# Patient Record
Sex: FEMALE | Race: WHITE | Employment: OTHER | URBAN - METROPOLITAN AREA
[De-identification: names, ages, dates, MRNs, and addresses within clinical notes are randomized per-mention and may not be internally consistent; named-entity substitution may affect disease eponyms.]

---

## 2021-10-10 ENCOUNTER — HOSPITAL ENCOUNTER (EMERGENCY)
Age: 76
Discharge: NURSING, REHAB OR LONG-TERM CARE | End: 2021-10-11
Attending: EMERGENCY MEDICINE
Payer: COMMERCIAL

## 2021-10-10 DIAGNOSIS — S81.802A WOUND OF LEFT LOWER EXTREMITY, INITIAL ENCOUNTER: Primary | ICD-10-CM

## 2021-10-10 DIAGNOSIS — D64.9 ANEMIA, UNSPECIFIED TYPE: ICD-10-CM

## 2021-10-10 PROCEDURE — 99284 EMERGENCY DEPT VISIT MOD MDM: CPT

## 2021-10-10 ASSESSMENT — PAIN DESCRIPTION - LOCATION: LOCATION: LEG

## 2021-10-10 ASSESSMENT — PAIN DESCRIPTION - PAIN TYPE: TYPE: ACUTE PAIN

## 2021-10-10 ASSESSMENT — PAIN SCALES - GENERAL: PAINLEVEL_OUTOF10: 5

## 2021-10-10 ASSESSMENT — PAIN DESCRIPTION - ORIENTATION: ORIENTATION: LEFT

## 2021-10-11 VITALS
DIASTOLIC BLOOD PRESSURE: 60 MMHG | OXYGEN SATURATION: 94 % | HEART RATE: 62 BPM | RESPIRATION RATE: 16 BRPM | TEMPERATURE: 98.2 F | SYSTOLIC BLOOD PRESSURE: 120 MMHG

## 2021-10-11 LAB
ALBUMIN SERPL-MCNC: 2.8 G/DL (ref 3.5–5.2)
ALP BLD-CCNC: 95 U/L (ref 35–104)
ALT SERPL-CCNC: 9 U/L (ref 0–32)
ANION GAP SERPL CALCULATED.3IONS-SCNC: 7 MMOL/L (ref 7–16)
AST SERPL-CCNC: 18 U/L (ref 0–31)
BASOPHILS ABSOLUTE: 0.06 E9/L (ref 0–0.2)
BASOPHILS RELATIVE PERCENT: 0.8 % (ref 0–2)
BILIRUB SERPL-MCNC: 0.5 MG/DL (ref 0–1.2)
BUN BLDV-MCNC: 18 MG/DL (ref 6–23)
CALCIUM SERPL-MCNC: 8.5 MG/DL (ref 8.6–10.2)
CHLORIDE BLD-SCNC: 103 MMOL/L (ref 98–107)
CO2: 27 MMOL/L (ref 22–29)
CREAT SERPL-MCNC: 0.8 MG/DL (ref 0.5–1)
EOSINOPHILS ABSOLUTE: 0.25 E9/L (ref 0.05–0.5)
EOSINOPHILS RELATIVE PERCENT: 3.3 % (ref 0–6)
GFR AFRICAN AMERICAN: >60
GFR NON-AFRICAN AMERICAN: >60 ML/MIN/1.73
GLUCOSE BLD-MCNC: 167 MG/DL (ref 74–99)
HCT VFR BLD CALC: 30.8 % (ref 34–48)
HEMOGLOBIN: 10.1 G/DL (ref 11.5–15.5)
IMMATURE GRANULOCYTES #: 0.02 E9/L
IMMATURE GRANULOCYTES %: 0.3 % (ref 0–5)
LYMPHOCYTES ABSOLUTE: 2.96 E9/L (ref 1.5–4)
LYMPHOCYTES RELATIVE PERCENT: 38.7 % (ref 20–42)
MCH RBC QN AUTO: 31.9 PG (ref 26–35)
MCHC RBC AUTO-ENTMCNC: 32.8 % (ref 32–34.5)
MCV RBC AUTO: 97.2 FL (ref 80–99.9)
MONOCYTES ABSOLUTE: 0.59 E9/L (ref 0.1–0.95)
MONOCYTES RELATIVE PERCENT: 7.7 % (ref 2–12)
NEUTROPHILS ABSOLUTE: 3.76 E9/L (ref 1.8–7.3)
NEUTROPHILS RELATIVE PERCENT: 49.2 % (ref 43–80)
PDW BLD-RTO: 13.4 FL (ref 11.5–15)
PLATELET # BLD: 201 E9/L (ref 130–450)
PMV BLD AUTO: 11.3 FL (ref 7–12)
POTASSIUM REFLEX MAGNESIUM: 4.2 MMOL/L (ref 3.5–5)
RBC # BLD: 3.17 E12/L (ref 3.5–5.5)
SODIUM BLD-SCNC: 137 MMOL/L (ref 132–146)
TOTAL PROTEIN: 5.6 G/DL (ref 6.4–8.3)
WBC # BLD: 7.6 E9/L (ref 4.5–11.5)

## 2021-10-11 PROCEDURE — 6370000000 HC RX 637 (ALT 250 FOR IP): Performed by: EMERGENCY MEDICINE

## 2021-10-11 PROCEDURE — 80053 COMPREHEN METABOLIC PANEL: CPT

## 2021-10-11 PROCEDURE — 85025 COMPLETE CBC W/AUTO DIFF WBC: CPT

## 2021-10-11 RX ORDER — CEPHALEXIN 500 MG/1
500 CAPSULE ORAL EVERY 6 HOURS
Qty: 28 CAPSULE | Refills: 0 | Status: SHIPPED | OUTPATIENT
Start: 2021-10-11 | End: 2021-10-18

## 2021-10-11 RX ORDER — CEPHALEXIN 500 MG/1
500 CAPSULE ORAL ONCE
Status: COMPLETED | OUTPATIENT
Start: 2021-10-11 | End: 2021-10-11

## 2021-10-11 RX ORDER — DIAPER,BRIEF,INFANT-TODD,DISP
EACH MISCELLANEOUS
Status: DISCONTINUED
Start: 2021-10-11 | End: 2021-10-11 | Stop reason: HOSPADM

## 2021-10-11 RX ADMIN — CEPHALEXIN 500 MG: 500 CAPSULE ORAL at 01:51

## 2021-10-11 NOTE — ED NOTES
Bed:  HALL-06  Expected date:   Expected time:   Means of arrival:   Comments:  EMS     Drea Iqbal RN  12/79/73 1233

## 2021-10-11 NOTE — ED PROVIDER NOTES
HPI:  10/11/21, Time: 12:08 AM EDT         Siena Kim is a 67 y.o. female presenting to the ED for evaluation of wound to her left shin. Patient has a history of dementia, so I was unable to obtain a complete history. History was obtained from EMS as well as nursing who spoke with the nursing home. Patient presents from [facility]. Patient has reportedly had a wound to her left shin for months. Her daughter wanted the patient's wound evaluated, so they sent the patient to the ED for further evaluation. There has been no history of fevers. Patient states that she is feeling nauseous but otherwise has no complaints. Review of Systems:   Unable to obtain complete ROS due to dementia.        --------------------------------------------- PAST HISTORY ---------------------------------------------  Past Medical History:  has a past medical history of Abnormal stress ECG, Aortic stenosis, CAD (coronary artery disease), Cerebrovascular disease, Diabetes mellitus ([facility] [address] Diverticulitis, dyslipidemia, Hypertension, Hypothyroidism, MI (myocardial infarction) ([facility] [address] Obesity, Osteoporosis, Thyroid disease, TIA (transient ischemic attack), and Type II or unspecified type diabetes mellitus without mention of complication, not stated as uncontrolled. Past Surgical History:  has a past surgical history that includes Hysterectomy; hernia repair; Cholecystectomy; Dilatation, esophagus; Cardiac catheterization (6/06/12); vascular surgery; Tonsillectomy; Appendectomy; Diagnostic Cardiac Cath Lab Procedure; Cardiac surgery (6/15/12); Coronary artery bypass graft (6/15/12); and Cardiac valve replacement (June 2012). Social History:  reports that she quit smoking about 51 years ago. She smoked 0.50 packs per day. She has never used smokeless tobacco. She reports that she does not drink alcohol and does not use drugs.     Family History: family history includes Heart Disease in her brother and father; High Blood Pressure in her brother and father. The patients home medications have been reviewed.     Allergies: Phenergan [promethazine hcl]    -------------------------------------------------- RESULTS -------------------------------------------------  All laboratory and radiology results have been personally reviewed by myself   LABS:  Results for orders placed or performed during the hospital encounter of 10/10/21   CBC Auto Differential   Result Value Ref Range    WBC 7.6 4.5 - 11.5 E9/L    RBC 3.17 (L) 3.50 - 5.50 E12/L    Hemoglobin 10.1 (L) 11.5 - 15.5 g/dL    Hematocrit 30.8 (L) 34.0 - 48.0 %    MCV 97.2 80.0 - 99.9 fL    MCH 31.9 26.0 - 35.0 pg    MCHC 32.8 32.0 - 34.5 %    RDW 13.4 11.5 - 15.0 fL    Platelets 897 934 - 297 E9/L    MPV 11.3 7.0 - 12.0 fL    Neutrophils % 49.2 43.0 - 80.0 %    Immature Granulocytes % 0.3 0.0 - 5.0 %    Lymphocytes % 38.7 20.0 - 42.0 %    Monocytes % 7.7 2.0 - 12.0 %    Eosinophils % 3.3 0.0 - 6.0 %    Basophils % 0.8 0.0 - 2.0 %    Neutrophils Absolute 3.76 1.80 - 7.30 E9/L    Immature Granulocytes # 0.02 E9/L    Lymphocytes Absolute 2.96 1.50 - 4.00 E9/L    Monocytes Absolute 0.59 0.10 - 0.95 E9/L    Eosinophils Absolute 0.25 0.05 - 0.50 E9/L    Basophils Absolute 0.06 0.00 - 0.20 E9/L   Comprehensive Metabolic Panel w/ Reflex to MG   Result Value Ref Range    Sodium 137 132 - 146 mmol/L    Potassium reflex Magnesium 4.2 3.5 - 5.0 mmol/L    Chloride 103 98 - 107 mmol/L    CO2 27 22 - 29 mmol/L    Anion Gap 7 7 - 16 mmol/L    Glucose 167 (H) 74 - 99 mg/dL    BUN 18 6 - 23 mg/dL    CREATININE 0.8 0.5 - 1.0 mg/dL    GFR Non-African American >60 >=60 mL/min/1.73    GFR African American >60     Calcium 8.5 (L) 8.6 - 10.2 mg/dL    Total Protein 5.6 (L) 6.4 - 8.3 g/dL    Albumin 2.8 (L) 3.5 - 5.2 g/dL    Total Bilirubin 0.5 0.0 - 1.2 mg/dL    Alkaline Phosphatase 95 35 - 104 U/L    ALT 9 0 - 32 U/L    AST 18 0 - 31 U/L       RADIOLOGY:  Interpreted by Radiologist.  No orders to display ------------------------- NURSING NOTES AND VITALS REVIEWED ---------------------------   The nursing notes within the ED encounter and vital signs as below have been reviewed. /60   Pulse 62   Temp 98.2 °F (36.8 °C) (Oral)   Resp 16   SpO2 94%   Oxygen Saturation Interpretation: Normal      ---------------------------------------------------PHYSICAL EXAM--------------------------------------      Constitutional/General: Alert and oriented x2, appears chronically ill, non toxic in NAD  Head: Normocephalic and atraumatic  Eyes: EOMI. No scleral icterus. Mouth: Oropharynx clear, handling secretions, no trismus  Neck: Supple, full ROM,   Pulmonary: Lungs clear to auscultation bilaterally, no wheezes, rales, or rhonchi. Not in respiratory distress  Cardiovascular:  Regular rate and rhythm, no murmurs, gallops, or rubs. 2+ distal pulses  Abdomen: Soft, non tender, non distended,   Extremities: Warm and well perfused. Left lower extremity-chronic appearing wound to shin with pink granulation tissue, no drainage, no crepitus, no significant surrounding erythema, no induration, soft and easily compressible compartments, pedal pulses intact. Skin: warm and dry without rash  Neurologic: Awake, alert to person and place (patient reportedly has history of dementia), no focal motor or sensory deficits   Psych: Normal Affect. Behavior normal.      ------------------------------ ED COURSE/MEDICAL DECISION MAKING----------------------  Medications   cephALEXin (KEFLEX) capsule 500 mg (500 mg Oral Given 10/11/21 0151)       Medical Decision Making/ED COURSE:   Patient is a 50-year-old female presenting for wound check to her left shin. In the ED, patient was hemodynamically stable and afebrile. On exam, was well-appearing. She had a chronic appearing wound with granulation tissue to her left shin with some very mild surrounding cellulitis. She was neurovascularly intact. No evidence of acute limb ischemia.   No evidence of compartment syndrome. I reviewed and interpreted labs. Patient had mild anemia but otherwise unremarkable labs. No leukocytosis. No fevers or tachycardia. No evidence of sepsis. No evidence of active bleeding. Discussed anemia with family, and advised further outpatient work-up. Patient will be treated with Keflex and given wound center referral.  Supportive care measures and ED return precautions discussed with patient and daughter at bedside. Patient remained hemodynamically stable throughout ED course. ED Course as of Oct 11 0713   Mon Oct 11, 2021   0110 Discussed findings and plan for wound care referral with daughter at bedside. Strict ED return precautions discussed. [JA]      ED Course User Index  [JA] Julia Navarrete MD         Counseling: The emergency provider has spoken with the patient and daughter and discussed todays results, in addition to providing specific details for the plan of care and counseling regarding the diagnosis and prognosis. Questions are answered at this time and they are agreeable with the plan.      --------------------------------- IMPRESSION AND DISPOSITION ---------------------------------    IMPRESSION  1. Wound of left lower extremity, initial encounter    2. Anemia, unspecified type        DISPOSITION  Disposition: Discharge to nursing home  Patient condition is stable      NOTE: This report was transcribed using voice recognition software.  Every effort was made to ensure accuracy; however, inadvertent computerized transcription errors may be present    IJulia MD, am the primary provider of this record       Julia Navarrete MD  10/11/21 1101

## 2021-10-19 ENCOUNTER — HOSPITAL ENCOUNTER (OUTPATIENT)
Age: 76
Discharge: HOME | End: 2021-10-19
Payer: COMMERCIAL

## 2021-10-19 ENCOUNTER — HOSPITAL ENCOUNTER (OUTPATIENT)
Dept: GENERAL RADIOLOGY | Age: 76
Discharge: HOME | End: 2021-10-21
Payer: COMMERCIAL

## 2021-10-19 ENCOUNTER — HOSPITAL ENCOUNTER (OUTPATIENT)
Age: 76
Discharge: HOME | End: 2021-10-21
Payer: COMMERCIAL

## 2021-10-19 ENCOUNTER — HOSPITAL ENCOUNTER (OUTPATIENT)
Dept: WOUND CARE | Age: 76
Discharge: HOME | End: 2021-10-19
Payer: COMMERCIAL

## 2021-10-19 VITALS
BODY MASS INDEX: 32.42 KG/M2 | HEART RATE: 63 BPM | SYSTOLIC BLOOD PRESSURE: 112 MMHG | TEMPERATURE: 97.3 F | HEIGHT: 60 IN | RESPIRATION RATE: 16 BRPM | DIASTOLIC BLOOD PRESSURE: 70 MMHG

## 2021-10-19 DIAGNOSIS — R09.89 DECREASED DORSALIS PEDIS PULSE: ICD-10-CM

## 2021-10-19 DIAGNOSIS — L97.222 ULCER OF CALF WITH FAT LAYER EXPOSED, LEFT (HCC): ICD-10-CM

## 2021-10-19 DIAGNOSIS — I70.242 ATHEROSCLEROSIS OF NATIVE ARTERY OF LEFT LOWER EXTREMITY WITH ULCERATION OF CALF (HCC): ICD-10-CM

## 2021-10-19 DIAGNOSIS — I69.354 HEMIPARESIS OF LEFT NONDOMINANT SIDE AS LATE EFFECT OF CEREBRAL INFARCTION (HCC): ICD-10-CM

## 2021-10-19 DIAGNOSIS — R26.2 CANNOT WALK: ICD-10-CM

## 2021-10-19 PROBLEM — I69.954 HEMIPARESIS OF LEFT NONDOMINANT SIDE AS LATE EFFECT OF CEREBROVASCULAR DISEASE (HCC): Status: ACTIVE | Noted: 2021-10-19

## 2021-10-19 PROBLEM — I70.249 ATHEROSCLEROSIS OF NATIVE ARTERY OF LEFT LOWER EXTREMITY WITH ULCERATION (HCC): Status: ACTIVE | Noted: 2021-10-19

## 2021-10-19 LAB
C-REACTIVE PROTEIN: 2 MG/DL (ref 0–0.4)
RHEUMATOID FACTOR: <10 IU/ML (ref 0–13)
SEDIMENTATION RATE, ERYTHROCYTE: 51 MM/HR (ref 0–20)

## 2021-10-19 PROCEDURE — 86038 ANTINUCLEAR ANTIBODIES: CPT

## 2021-10-19 PROCEDURE — 87070 CULTURE OTHR SPECIMN AEROBIC: CPT

## 2021-10-19 PROCEDURE — 87075 CULTR BACTERIA EXCEPT BLOOD: CPT

## 2021-10-19 PROCEDURE — 87186 SC STD MICRODIL/AGAR DIL: CPT

## 2021-10-19 PROCEDURE — 99204 OFFICE O/P NEW MOD 45 MIN: CPT | Performed by: SURGERY

## 2021-10-19 PROCEDURE — 73590 X-RAY EXAM OF LOWER LEG: CPT

## 2021-10-19 PROCEDURE — 87077 CULTURE AEROBIC IDENTIFY: CPT

## 2021-10-19 PROCEDURE — 85651 RBC SED RATE NONAUTOMATED: CPT

## 2021-10-19 PROCEDURE — 99213 OFFICE O/P EST LOW 20 MIN: CPT

## 2021-10-19 PROCEDURE — 11042 DBRDMT SUBQ TIS 1ST 20SQCM/<: CPT | Performed by: SURGERY

## 2021-10-19 PROCEDURE — 86431 RHEUMATOID FACTOR QUANT: CPT

## 2021-10-19 PROCEDURE — 36415 COLL VENOUS BLD VENIPUNCTURE: CPT

## 2021-10-19 PROCEDURE — 11042 DBRDMT SUBQ TIS 1ST 20SQCM/<: CPT

## 2021-10-19 PROCEDURE — 87205 SMEAR GRAM STAIN: CPT

## 2021-10-19 PROCEDURE — 86140 C-REACTIVE PROTEIN: CPT

## 2021-10-19 RX ORDER — CLOBETASOL PROPIONATE 0.5 MG/G
OINTMENT TOPICAL ONCE
Status: CANCELLED | OUTPATIENT
Start: 2021-10-19 | End: 2021-10-19

## 2021-10-19 RX ORDER — IBUPROFEN 200 MG
200 TABLET ORAL EVERY 8 HOURS PRN
Qty: 120 TABLET | Refills: 3 | Status: ON HOLD | OUTPATIENT
Start: 2021-10-19 | End: 2022-06-29 | Stop reason: HOSPADM

## 2021-10-19 RX ORDER — GINSENG 100 MG
CAPSULE ORAL ONCE
Status: CANCELLED | OUTPATIENT
Start: 2021-10-19 | End: 2021-10-19

## 2021-10-19 RX ORDER — BETAMETHASONE DIPROPIONATE 0.05 %
OINTMENT (GRAM) TOPICAL ONCE
Status: CANCELLED | OUTPATIENT
Start: 2021-10-19 | End: 2021-10-19

## 2021-10-19 RX ORDER — LIDOCAINE HYDROCHLORIDE 40 MG/ML
SOLUTION TOPICAL ONCE
Status: CANCELLED | OUTPATIENT
Start: 2021-10-19 | End: 2021-10-19

## 2021-10-19 RX ORDER — BACITRACIN, NEOMYCIN, POLYMYXIN B 400; 3.5; 5 [USP'U]/G; MG/G; [USP'U]/G
OINTMENT TOPICAL ONCE
Status: CANCELLED | OUTPATIENT
Start: 2021-10-19 | End: 2021-10-19

## 2021-10-19 RX ORDER — BACITRACIN ZINC AND POLYMYXIN B SULFATE 500; 1000 [USP'U]/G; [USP'U]/G
OINTMENT TOPICAL ONCE
Status: CANCELLED | OUTPATIENT
Start: 2021-10-19 | End: 2021-10-19

## 2021-10-19 RX ORDER — LIDOCAINE 40 MG/G
CREAM TOPICAL ONCE
Status: CANCELLED | OUTPATIENT
Start: 2021-10-19 | End: 2021-10-19

## 2021-10-19 RX ORDER — LIDOCAINE HYDROCHLORIDE 20 MG/ML
JELLY TOPICAL ONCE
Status: CANCELLED | OUTPATIENT
Start: 2021-10-19 | End: 2021-10-19

## 2021-10-19 RX ORDER — LIDOCAINE 50 MG/G
OINTMENT TOPICAL ONCE
Status: CANCELLED | OUTPATIENT
Start: 2021-10-19 | End: 2021-10-19

## 2021-10-19 RX ORDER — GENTAMICIN SULFATE 1 MG/G
OINTMENT TOPICAL ONCE
Status: CANCELLED | OUTPATIENT
Start: 2021-10-19 | End: 2021-10-19

## 2021-10-19 ASSESSMENT — PAIN DESCRIPTION - FREQUENCY: FREQUENCY: CONTINUOUS

## 2021-10-19 ASSESSMENT — PAIN DESCRIPTION - PAIN TYPE: TYPE: CHRONIC PAIN

## 2021-10-19 ASSESSMENT — PAIN DESCRIPTION - ORIENTATION: ORIENTATION: LEFT

## 2021-10-19 ASSESSMENT — PAIN DESCRIPTION - PROGRESSION: CLINICAL_PROGRESSION: NOT CHANGED

## 2021-10-19 ASSESSMENT — PAIN SCALES - GENERAL: PAINLEVEL_OUTOF10: 9

## 2021-10-19 ASSESSMENT — PAIN DESCRIPTION - LOCATION: LOCATION: LEG

## 2021-10-19 ASSESSMENT — PAIN - FUNCTIONAL ASSESSMENT: PAIN_FUNCTIONAL_ASSESSMENT: PREVENTS OR INTERFERES SOME ACTIVE ACTIVITIES AND ADLS

## 2021-10-19 ASSESSMENT — PAIN DESCRIPTION - DESCRIPTORS: DESCRIPTORS: ACHING

## 2021-10-19 ASSESSMENT — PAIN DESCRIPTION - ONSET: ONSET: ON-GOING

## 2021-10-19 NOTE — PROGRESS NOTES
Wound Healing Center /Hyperbarics   History and Physical/Consultation  Vascular    Referring Physician : MD Sandeep Mauricio [id number] RECORD NUMBER:  [id number]  AGE: 67 y.o. GENDER: female  : 1945  EPISODE DATE:  10/19/2021  Subjective:     Chief Complaint   Patient presents with    Wound Check     Left leg         HISTORY of PRESENT ILLNESS HPI     Sloane Henry is a 67 y.o. female who presents today for wound/ulcer evaluation. History of Wound Context:  The patient has had a wound of left calf, overlying the shin of the left leg, which was first noted approximately 6 months ago, tells me initially probably started as a scratch, with the [facility] lifting, subsequently has gotten worse. This has been treated by her wound care doctor at the facility, because of lack of improvement, patient's family insisted on coming to the wound care for further evaluation        . On their initial visit to the wound healing center, 10/19/21, the patient has noted that the wound has not been improving. The patient has not had similar previous wounds in the past.      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       Wound/Ulcer Pain Timing/Severity: constant  Quality of pain: dull, aching  Severity:  3 / 10   Modifying Factors: None  Associated Signs/Symptoms: drainage and pain    Ulcer Identification:  Ulcer Type: arterial, non-healing/non-surgical and traumatic  Contributing Factors: arterial insufficiency and malnutrition    Diabetic/Pressure/Non Pressure Ulcers only:  Ulcer: Diabetic ulcer, fat layer exposed    If patient has diabetic lower extremity wounds  Hensley Classification of diabetic lower extremity wounds:    Grade Description   []  0 No open wound   []  1 Superficial ulcer involving the full skin thickness   []  2 Deep ulcer involves ligament, tendon, joint capsule, or fascia  No bone involvement or abscess presence   []  3 Deep Ulcer with abcess formation and/or osteomyelitis   []  4 Localized gangrene   []  5 Extensive gangrene of the foot     Wound: Patient does have a wound, 4 x 5 cm, with significant granulation tissue, does not appear to be typical traumatic wound ulcer, potential possibility of underlying malignancy may need to be considered, for today, debridement of the wound was performed superficially with wound cultures, next time, when the patient comes back next week, will consider wound biopsy under local infiltration lidocaine anesthesia with the patient and family agree    Other operative intervention:    1.   The emergency room notes will patient went to the emergency room on wound evaluation was reviewed, there are recommended the patient and family to go to the wound care center for additional work-up, prescription was given for Keflex and referred to wound care center    10/19/2021  · Discussed the patient and the patient's daughter Mayra Tomlin who came with the patient telephone #[phone], recommended them, complete lower committee artery Doppler study because diminished pulses, x-ray of the tibia to make sure there is no underlying bone involvement, wound cultures were done, we will dress the wound for now with the Aquacel Ag and Tubigrip pending wound cultures and results of the lower extremity artery Doppler study  · Discussed the nursing staff, at the time of next visit, consider biopsy of the wound also because of the atypical presentation of the wound  At the request of the patient's family, prescription was given for Advil 20 mg p.o. 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 ([facility] [address] 10/19/2021    CAD (coronary artery disease)     Cerebrovascular disease     Decreased dorsalis pedis pulse 10/19/2021    Diabetes mellitus ([address]     Diverticulitis     dyslipidemia     Hypertension     Hypothyroidism     MI (myocardial infarction) ([address]     questionable 1986    Obesity     Osteoporosis     Thyroid disease     TIA (transient ischemic attack)     several post op TIAs    Type II or unspecified type diabetes mellitus without mention of complication, not stated as uncontrolled     Ulcer of calf with fat layer exposed, left ([address] 10/19/2021     Past Surgical History:   Procedure Laterality Date    APPENDECTOMY      CARDIAC CATHETERIZATION  6/06/12    Heart Lab, EF 65%, two vessel CAD, total prox RCA wtih left to right collaterals, prox OM branch 80% and mild CX 40%, severe aortic stenosis. Surgery consult advised for CABG and AVR. [facility] CARDIAC SURGERY  6/15/12    Epiaortic ultrasound scan. LISET. CABG x2 with reverse SVG to PDA, reverse SVG to second OMB. Aortic valve replacement with size 21 Mosaic ultra porcine heart valve.   Mitral valve repair with insertion of size 28 future annuloplasty band, [address] VALVE REPLACEMENT  June 2012    AVR size 21 mosaic ultra porcine valve     CHOLECYSTECTOMY      CORONARY ARTERY BYPASS GRAFT  6/15/12    DIAGNOSTIC CARDIAC CATH LAB PROCEDURE      DILATATION, ESOPHAGUS      HERNIA REPAIR      HYSTERECTOMY      age 43    TONSILLECTOMY      VASCULAR SURGERY       Family History   Problem Relation Age of Onset    High Blood Pressure Father     Heart Disease Father     Heart Disease Brother     High Blood Pressure Brother      Social History     Tobacco Use    Smoking status: Former Smoker     Packs/day: 0.50     Quit date: 6/6/1970     Years since quittin.4    Smokeless tobacco: Never Used   Substance Use Topics    Alcohol use: No    Drug use: No     Allergies   Allergen Reactions    Phenergan [Promethazine Hcl]      Current Outpatient Medications on File Prior to Encounter   Medication Sig Dispense Refill    CRESTOR 10 MG tablet Take 1 tablet by mouth daily. 30 tablet 6    ramipril (ALTACE) 10 MG tablet Take 10 mg by mouth daily.  aspirin 325 MG tablet Take 325 mg by mouth daily.  levothyroxine (SYNTHROID) 50 MCG tablet Take 50 mcg by mouth daily.  carvedilol (COREG) 3.125 MG tablet Take 3.125 mg by mouth 2 times daily (with meals).  sertraline (ZOLOFT) 100 MG tablet Take 100 mg by mouth daily. One and a half a day      nitroGLYCERIN (NITROSTAT) 0.4 MG SL tablet Place 0.4 mg under the tongue every 5 minutes as needed.  insulin glargine (LANTUS) 100 UNIT/ML injection Inject 30 Units into the skin 2 times daily.  metformin (GLUCOPHAGE) 500 MG tablet Take 1 tablet by mouth 2 times daily (with meals). 60 tablet 11     No current facility-administered medications on file prior to encounter.        REVIEW OF SYSTEMS   ROS : All others Negative if blank [], Positive if [x]  General Urinary   [] Fevers [] Hematuria   [] Chills [] Dysuria   [] Weight Loss Vascular   Skin [] Claudication   [x] Tissue Loss [] Rest Pain   Eyes Neurologic   [] Wears Glasses/Contacts [] Stroke/TIA   [] Vision Changes [] Focal weakness   Respiratory [] Slurred Speech    [] Shortness of breath ENT   Cardiovascular [] Difficulty swallowing   [] Chest Pain Gastrointestinal   [] Shortness of breath with exertion [] Abdominal Pain   Patient is not ambulatory for the last several years, partly because of stroke with left hemiparesis, generalized weakness and debilitated condition, with a history of cardiac bypass surgery, mitral and aortic valve replacement, diabetes mellitus, hypertension [] Melena       [] Hematochezia Objective:    /70   Pulse 63   Temp 97.3 °F (36.3 °C) (Tympanic)   Resp 16   Ht 5' (1.524 m)   BMI 32.42 kg/m²   Wt Readings from Last 3 Encounters:   08/13/13 166 lb (75.3 kg)   02/14/13 168 lb (76.2 kg)   06/06/12 178 lb 4 oz (80.9 kg)       PHYSICAL EXAM  CONSTITUTIONAL:   Awake, alert, cooperative  PSYCHIATRIC :  Oriented to time, place and person      normal insight to disease process  ENT:  External ears and nose without lesions    Hearing deficits is not noted  NECK: Supple, symmetrical, trachea midline    Thyroid goiter not appreciated    Carotid bruit is not noted bilaterally  LUNGS:  No increased work of breathing                  Clear to auscultation bilaterally   CARDIOVASCULAR:  regular rate and rhythm   ABDOMEN:  soft, non-distended, non-tender    Hernias is not noted   Aorta is not palpable   Lymphatics : Cervical lymphadenopathy is not noted     Femoral lymphadenopathy is not noted  SKIN:   Skin color is normal   Texture and turgor is  normal   Induration is not noted  EXTREMITIES:   R UE Edema is not noted  L UE Edema is not noted  R LE Edema is not noted  L LE Edema is not noted  R femoral 1 L femoral 1   R dorsalis pedis 0 L dorsalis pedis 0   R posterior tibial 0 L posterior tibial 0     Assessment:     Problem List Items Addressed This Visit     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 (HCC)    Relevant Orders    GIOVANNI    Rheumatoid Factor    Sedimentation rate, manual    C-Reactive Protein    VL LOWER EXTREMITY ARTERIAL SEGMENTAL PRESSURES W PPG    XR TIBIA FIBULA LEFT (2 VIEWS)    Decreased dorsalis pedis pulse    Relevant Orders    GIOVANNI    Rheumatoid Factor    Sedimentation rate, manual    C-Reactive Protein    VL LOWER EXTREMITY ARTERIAL SEGMENTAL PRESSURES W PPG    Ulcer of calf with fat layer exposed, left (HCC)    Relevant Orders    GIOVANNI    Rheumatoid Factor    Sedimentation rate, manual    C-Reactive Protein    XR TIBIA FIBULA LEFT (2 VIEWS) Procedure Note  Indications:  Based on my examination of this patient's wound(s)/ulcer(s) today, debridement is required to promote healing and evaluate the wound base. Performed by: Sophie Hickman MD    Consent obtained:  Yes    Time out taken:  Yes    Pain Control: Anesthetic  Anesthetic: 4% Lidocaine Liquid Topical     Debridement:Excisional Debridement    Using curette the wound(s)/ulcer(s) was/were sharply debrided down through and including the removal of epidermis, dermis and subcutaneous tissue. Devitalized Tissue Debrided:  fibrin and slough    Pre Debridement Measurements:  Are located in the [place]  Documentation Flow Sheet    Wound/Ulcer #: 1    Post Debridement Measurements:  Wound/Ulcer Descriptions are Pre Debridement except measurements:    Wound 10/19/21 Leg Anterior; Left #1 (Active)   Wound Image   10/19/21 0934   Wound Etiology Venous 10/19/21 0934   Wound Length (cm) 3.2 cm 10/19/21 0934   Wound Width (cm) 4.1 cm 10/19/21 0934   Wound Depth (cm) 0.1 cm 10/19/21 0934   Wound Surface Area (cm^2) 13.12 cm^2 10/19/21 0934   Wound Volume (cm^3) 1.312 cm^3 10/19/21 0934   Post-Procedure Length (cm) 3.2 cm 10/19/21 1028   Post-Procedure Width (cm) 4.1 cm 10/19/21 1028   Post-Procedure Depth (cm) 0.3 cm 10/19/21 1028   Post-Procedure Surface Area (cm^2) 13.12 cm^2 10/19/21 1028   Post-Procedure Volume (cm^3) 3.936 cm^3 10/19/21 1028   Wound Assessment Hyper granulation tissue;Fibrin 10/19/21 0934   Drainage Amount Small 10/19/21 0934   Drainage Description Serosanguinous 10/19/21 0934   Odor None 10/19/21 0934   Maggie-wound Assessment Blanchable erythema 10/19/21 0934   Number of days: 0       Percent of Wound/Ulcer Debrided: 90%    Total Surface Area Debrided:  12 sq cm     Estimated Blood Loss:  Minimal    Hemostasis Achieved:  by pressure    Procedural Pain:  4  / 10     Post Procedural Pain:  3 / 10     Response to treatment:  Well tolerated by patient. A culture was done. Plan:     Pt is not a smoker     In my professional opinion and based off the information that is available at this time this patient has appropriate indication for HBO Therapy: No    Treatment Note please see attached Discharge Instructions    Written patient dismissal instructions given to patient and signed by patient or POA. Discharge Instructions       Visit Discharge/Physician Orders    Discharge condition: Stable    Assessment of pain at discharge:minimal  Anesthetic used: 4% lidocaine    Discharge to: ECF    Left via:Private automobile    Accompanied by: accompanied by child    ECF/[facility]HA: [facility]- may take advil 200mg every 8 hours as needed for pain    Dressing Orders:left leg ulcer cleanse ulcer with normal saline apply alginate ag and dry dressing daily    Treatment Orders:  Culture obtained in clinic    Doppler study to be obtained in hospital    Eat foods high in protein and vitamin c    Take multivitamin daily    [address] [place] [address],[address] followup visit _______1 week______________________  (Please note your next appointment above and if you are unable to keep, kindly give a 24 hour notice. Thank you.)    Physician signature:__________________________      If you experience any of the following, please call the [address] during business hours:    * Increase in Pain  * Temperature over 101  * Increase in drainage from your wound  * Drainage with a foul odor  * Bleeding  * Increase in swelling  * Need for compression bandage changes due to slippage, breakthrough drainage. If you need medical attention outside of the business hours of the [address] please contact your PCP or go to the nearest emergency room.         Electronically signed by Ted Roberts MD on 10/19/2021 at 10:40 AM

## 2021-10-19 NOTE — PLAN OF CARE
Problem: Pain:  Goal: Pain level will decrease  Description: Pain level will decrease  Outcome: Ongoing  Goal: Control of acute pain  Description: Control of acute pain  Outcome: Ongoing  Goal: Control of chronic pain  Description: Control of chronic pain  Outcome: Ongoing     Problem: Wound:  Goal: Will show signs of wound healing; wound closure and no evidence of infection  Description: Will show signs of wound healing; wound closure and no evidence of infection  Outcome: Ongoing     Problem: Venous:  Goal: Signs of wound healing will improve  Description: Signs of wound healing will improve  Outcome: Ongoing

## 2021-10-20 LAB — ANTI-NUCLEAR ANTIBODY (ANA): NEGATIVE

## 2021-10-21 LAB
ANAEROBIC CULTURE: NORMAL
GRAM STAIN RESULT: ABNORMAL
ORGANISM: ABNORMAL
ORGANISM: ABNORMAL
WOUND/ABSCESS: ABNORMAL
WOUND/ABSCESS: ABNORMAL

## 2021-10-26 ENCOUNTER — HOSPITAL ENCOUNTER (OUTPATIENT)
Dept: WOUND CARE | Age: 76
Discharge: HOME | End: 2021-10-26
Payer: COMMERCIAL

## 2021-10-26 VITALS
HEART RATE: 56 BPM | RESPIRATION RATE: 16 BRPM | TEMPERATURE: 96.8 F | DIASTOLIC BLOOD PRESSURE: 74 MMHG | SYSTOLIC BLOOD PRESSURE: 110 MMHG

## 2021-10-26 DIAGNOSIS — I70.242 ATHEROSCLEROSIS OF NATIVE ARTERY OF LEFT LOWER EXTREMITY WITH ULCERATION OF CALF (HCC): ICD-10-CM

## 2021-10-26 DIAGNOSIS — R26.2 CANNOT WALK: ICD-10-CM

## 2021-10-26 DIAGNOSIS — L97.222 ULCER OF CALF WITH FAT LAYER EXPOSED, LEFT (HCC): Primary | ICD-10-CM

## 2021-10-26 DIAGNOSIS — R09.89 DECREASED DORSALIS PEDIS PULSE: ICD-10-CM

## 2021-10-26 DIAGNOSIS — I69.354 HEMIPARESIS OF LEFT NONDOMINANT SIDE AS LATE EFFECT OF CEREBRAL INFARCTION (HCC): ICD-10-CM

## 2021-10-26 PROCEDURE — 11106 INCAL BX SKN SINGLE LES: CPT

## 2021-10-26 PROCEDURE — 88307 TISSUE EXAM BY PATHOLOGIST: CPT

## 2021-10-26 PROCEDURE — 11042 DBRDMT SUBQ TIS 1ST 20SQCM/<: CPT | Performed by: SURGERY

## 2021-10-26 RX ORDER — LIDOCAINE HYDROCHLORIDE 20 MG/ML
JELLY TOPICAL ONCE
Status: CANCELLED | OUTPATIENT
Start: 2021-10-26 | End: 2021-10-26

## 2021-10-26 RX ORDER — BACITRACIN, NEOMYCIN, POLYMYXIN B 400; 3.5; 5 [USP'U]/G; MG/G; [USP'U]/G
OINTMENT TOPICAL ONCE
Status: CANCELLED | OUTPATIENT
Start: 2021-10-26 | End: 2021-10-26

## 2021-10-26 RX ORDER — GENTAMICIN SULFATE 1 MG/G
OINTMENT TOPICAL ONCE
Status: CANCELLED | OUTPATIENT
Start: 2021-10-26 | End: 2021-10-26

## 2021-10-26 RX ORDER — LIDOCAINE 50 MG/G
OINTMENT TOPICAL ONCE
Status: CANCELLED | OUTPATIENT
Start: 2021-10-26 | End: 2021-10-26

## 2021-10-26 RX ORDER — LIDOCAINE HYDROCHLORIDE 40 MG/ML
SOLUTION TOPICAL ONCE
Status: DISCONTINUED | OUTPATIENT
Start: 2021-10-26 | End: 2021-10-27 | Stop reason: HOSPADM

## 2021-10-26 RX ORDER — CEPHALEXIN 250 MG/1
250 CAPSULE ORAL 3 TIMES DAILY
Qty: 30 CAPSULE | Refills: 0 | Status: SHIPPED | OUTPATIENT
Start: 2021-10-26 | End: 2021-11-05

## 2021-10-26 RX ORDER — GINSENG 100 MG
CAPSULE ORAL ONCE
Status: CANCELLED | OUTPATIENT
Start: 2021-10-26 | End: 2021-10-26

## 2021-10-26 RX ORDER — LIDOCAINE 40 MG/G
CREAM TOPICAL ONCE
Status: CANCELLED | OUTPATIENT
Start: 2021-10-26 | End: 2021-10-26

## 2021-10-26 RX ORDER — LIDOCAINE HYDROCHLORIDE 40 MG/ML
SOLUTION TOPICAL ONCE
Status: CANCELLED | OUTPATIENT
Start: 2021-10-26 | End: 2021-10-26

## 2021-10-26 RX ORDER — BETAMETHASONE DIPROPIONATE 0.05 %
OINTMENT (GRAM) TOPICAL ONCE
Status: CANCELLED | OUTPATIENT
Start: 2021-10-26 | End: 2021-10-26

## 2021-10-26 RX ORDER — BACITRACIN ZINC AND POLYMYXIN B SULFATE 500; 1000 [USP'U]/G; [USP'U]/G
OINTMENT TOPICAL ONCE
Status: CANCELLED | OUTPATIENT
Start: 2021-10-26 | End: 2021-10-26

## 2021-10-26 RX ORDER — CLOBETASOL PROPIONATE 0.5 MG/G
OINTMENT TOPICAL ONCE
Status: CANCELLED | OUTPATIENT
Start: 2021-10-26 | End: 2021-10-26

## 2021-10-26 NOTE — PROGRESS NOTES
[facility] Healing Center Followup Visit Note    Referring Physician : MD Sandeep Vernon 104 RECORD NUMBER:  [id number]  AGE: 67 y.o. GENDER: female  : 1945  EPISODE DATE:  10/26/2021    Subjective:     Chief Complaint   Patient presents with    Wound Check     coccyx, left leg      HISTORY of PRESENT ILLNESS HPI   Leopoldo Solano is a 67 y.o. female who presents today in regards to follow up evaluation and treatment of wound/ulcer. That patient's past medical, family and social hx were reviewed and changes were made if present. History of Wound Context:  The patient has had a wound of left calf, overlying the shin of the left leg, which was first noted approximately 6 months ago, tells me initially probably started as a scratch, with the [facility] lifting, subsequently has gotten worse. This has been treated by her wound care doctor at the facility, because of lack of improvement, patient's family insisted on coming to the wound care for further evaluation            On their initial visit to the wound healing center, 10/19/21, the patient has noted that the wound has not been improving. The patient has not had similar previous wounds in the past.       Patient sustained a stroke with left hemiparesis many years ago, has not ambulated since the time, has multiple other risk factors including coronary artery disease, cardiac bypass surgery, aortic and mitral valve replacement, diabetes mellitus, hypertension and history of TIA     Patient is a parenchyma is comfortable wheelchair, does not ambulate, thinly built     Pt is currently not on abx.         10/26/2021  · Discussed with the patient and patient's daughter Ángel Moura who came with the patient regarding wound cultures, will prescribe, because of body weight, to 50 mg every 8 hours for 10 days based on the wound cultures  · Also because of atypical presentation of ulcer with evidence of exophytic growth of the wound, they have recommended biopsy excisional of the wound under local anesthesia, risks benefits were explained include bleeding complications which they understand and agree  · 1% lidocaine was infiltrated at the base of the wound, excisional debridement using a #10 blade was performed, over an area 1 to 2 cm at least with some normal skin being included  ·   Significant oozing noted, controlled with topical pressure subsequently silver nitrate sticks, and eventually a compression wrap was applied with Coban I came back, remove the Coban wrap, 30 minutes later, no further bleeding noted  · The wound will be dressed, for now till the next visit, with Xeroform gauze, 4 x 4's and a Kerlix wrap if necessary Coban wrap to control any bleeding  · Will wait for the rest of the lower extremity arterial Doppler study and the pathology report before making any additional recommendations  · All the questions were answered            Wound/Ulcer Pain Timing/Severity: constant  Quality of pain: dull, aching  Severity:  3 / 10   Modifying Factors: None  Associated Signs/Symptoms: drainage and pain     Ulcer Identification:  Ulcer Type: arterial, non-healing/non-surgical and traumatic  Contributing Factors: arterial insufficiency and malnutrition     Diabetic/Pressure/Non Pressure Ulcers only:  Ulcer: Diabetic ulcer, fat layer exposed     If patient has diabetic lower extremity wounds  Hensley Classification of diabetic lower extremity wounds:     Grade Description   []? 0 No open wound   []? 1 Superficial ulcer involving the full skin thickness   []? 2 Deep ulcer involves ligament, tendon, joint capsule, or fascia  No bone involvement or abscess presence   []? 3 Deep Ulcer with abcess formation and/or osteomyelitis   []? 4 Localized gangrene   []?   5 Extensive gangrene of the foot      Wound: Patient does have a wound, 4 x 5 cm, with significant granulation tissue, does not appear to be typical traumatic wound ulcer, potential possibility of underlying malignancy may need to be considered, for today, debridement of the wound was performed superficially with wound cultures, next time, when the patient comes back next week, will consider wound biopsy under local infiltration lidocaine anesthesia with the patient and family agree     Other operative intervention:     1.   The emergency room notes will patient went to the emergency room on wound evaluation was reviewed, there are recommended the patient and family to go to the wound care center for additional work-up, prescription was given for Keflex and referred to wound care center     10/19/2021  · Discussed the patient and the patient's daughter Alvaro Collier who came with the patient telephone #[phone], recommended them, complete lower committee artery Doppler study because diminished pulses, x-ray of the tibia to make sure there is no underlying bone involvement, wound cultures were done, we will dress the wound for now with the Aquacel Ag and Tubigrip pending wound cultures and results of the lower extremity artery Doppler study  · Discussed the nursing staff, at the time of next visit, consider biopsy of the wound also because of the atypical presentation of the wound  At the request of the patient's family, prescription was given for Advil 200 mg p.o. 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 30 tablet 6    ramipril (ALTACE) 10 MG tablet Take 10 mg by mouth daily.  aspirin 325 MG tablet Take 325 mg by mouth daily.  levothyroxine (SYNTHROID) 50 MCG tablet Take 50 mcg by mouth daily.  carvedilol (COREG) 3.125 MG tablet Take 3.125 mg by mouth 2 times daily (with meals).  sertraline (ZOLOFT) 100 MG tablet Take 100 mg by mouth daily. One and a half a day      nitroGLYCERIN (NITROSTAT) 0.4 MG SL tablet Place 0.4 mg under the tongue every 5 minutes as needed.  insulin glargine (LANTUS) 100 UNIT/ML injection Inject 30 Units into the skin 2 times daily.  metformin (GLUCOPHAGE) 500 MG tablet Take 1 tablet by mouth 2 times daily (with meals). 60 tablet 11     No current facility-administered medications on file prior to encounter.        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          Pre Debridement Measurements:  Are located in the [place]  Documentation Flow Sheet  Post Debridement Measurements:  Wound/Ulcer Descriptions are Pre Debridement except measurements:     Wound 10/19/21 Leg Anterior; Left #1 (Active)   Wound Image   10/19/21 0934   Wound Etiology Venous 10/19/21 0934   Dressing Status New dressing applied;Clean;Dry; Intact 10/26/21 1155   Wound Cleansed Cleansed with saline 10/26/21 1155   Dressing/Treatment Xeroform;ABD;Dry dressing 10/26/21 1155   Wound Length (cm) 3.6 cm 10/26/21 1018   Wound Width (cm) 4.5 cm 10/26/21 1018   Wound Depth (cm) 0.1 cm 10/26/21 1018   Wound Surface Area (cm^2) 16.2 cm^2 10/26/21 1018   Change in Wound Size % (l*w) -23.48 10/26/21 1018   Wound Volume (cm^3) 1.62 cm^3 10/26/21 1018   Wound Healing % -23 10/26/21 1018   Post-Procedure Length (cm) 2.7 cm 10/26/21 1132   Post-Procedure Width (cm) 4.5 cm 10/26/21 1132   Post-Procedure Depth (cm) 0.3 cm 10/26/21 1132   Post-Procedure Surface Area (cm^2) 12.15 cm^2 10/26/21 1132   Post-Procedure Volume (cm^3) 3.645 cm^3 10/26/21 1132   Wound Assessment Hyper granulation tissue;Fibrin 10/26/21 1018   Drainage Amount Moderate 10/26/21 1018   Drainage Description Serosanguinous 10/26/21 1018   Odor None 10/26/21 1018   Maggie-wound Assessment Maceration 10/26/21 1018   Number of days: 7       Wound 10/26/21 Buttocks Left #2 (Active)   Wound Image   10/26/21 1018   Dressing Status New dressing applied;Clean;Dry; Intact 10/26/21 1155   Wound Cleansed Cleansed with saline 10/26/21 1155   Dressing/Treatment Collagen;Dry dressing 10/26/21 1155   Wound Length (cm) 0.4 cm 10/26/21 1018   Wound Width (cm) 1.1 cm 10/26/21 1018   Wound Depth (cm) 0.1 cm 10/26/21 1018   Wound Surface Area (cm^2) 0.44 cm^2 10/26/21 1018   Wound Volume (cm^3) 0.044 cm^3 10/26/21 1018   Wound Assessment Pale granulation tissue 10/26/21 1018   Drainage Amount Scant 10/26/21 1018   Drainage Description Serosanguinous 10/26/21 1018   Odor None 10/26/21 1018   Maggie-wound Assessment Intact 10/26/21 1018 Number of days: 0          Procedure Note  Indications:  Based on my examination of this patient's wound(s)/ulcer(s) today, debridement is required to promote healing and evaluate the wound base. Performed by: Ted Roberts MD    Consent obtained:  Yes    Time out taken:  Yes    Pain Control: Anesthetic  Anesthetic: 1% Lidocaine Injectable with Epinephrine     Debridement:Excisional Debridement    Using curette and #15 blade scalpel the wound(s)/ulcer(s) was/were sharply debrided down through and including the removal of epidermis, dermis and subcutaneous tissue. Devitalized Tissue Debrided:  fibrin and slough to stimulate bleeding to promote healing, post debridement good bleeding base and wound edges noted    Wound/Ulcer #: 1    Percent of Wound/Ulcer Debrided: 100%    Total Surface Area Debrided:  12 sq cm     Estimated Blood Loss:  Estimated amount of blood loss is 15ml. Hemostasis Achieved:  by pressure and by silver nitrate stick    Procedural Pain:  4  / 10   Post Procedural Pain:  2 / 10     Response to treatment:  Well tolerated by patient. Plan:   Treatment Note please see attached Discharge Instructions    Written patient dismissal instructions given to patient and signed by patient or POA.          Discharge Instructions       Visit Discharge/Physician Orders     Discharge condition: Stable     Assessment of pain at discharge:minimal  Anesthetic used: 4% lidocaine     Discharge to: ECF     Left via:Private automobile     Accompanied by: accompanied by child     ECF/HHA: [facility] connelly take advil 200mg every 8 hours as needed for pain     Dressing Orders:left leg ulcer cleanse ulcer with normal saline apply xeroform and dry dressing daily     coccyx ulcer cleanse with normal saline apply promogran and foam boarder guaze every other day    Treatment Orders:  biopsy obtained in clinic     Doppler study to be obtained in hospital     Eat foods high in protein and vitamin c     Take multivitamin daily     [facility] followup visit _______1 week______________________  (Please note your next appointment above and if you are unable to keep, kindly give a 24 hour notice.  Thank you.)     Physician signature:__________________________        If you experience any of the following, please call the [address] [place] [address] Road during business hours:     * Increase in Pain  * Temperature over 101  * Increase in drainage from your wound  * Drainage with a foul odor  * Bleeding  * Increase in swelling  * Need for compression bandage changes due to slippage, breakthrough drainage.     If you need medical attention outside of the business hours of the [address] [place] [address] [address] please contact your PCP or go to the nearest emergency room.                 Electronically signed by Austin Armstrong MD on 10/26/2021 at 4:37 PM

## 2021-11-02 ENCOUNTER — HOSPITAL ENCOUNTER (OUTPATIENT)
Dept: WOUND CARE | Age: 76
Discharge: HOME | End: 2021-11-02
Payer: COMMERCIAL

## 2021-11-02 ENCOUNTER — HOSPITAL ENCOUNTER (OUTPATIENT)
Dept: INTERVENTIONAL RADIOLOGY/VASCULAR | Age: 76
Discharge: HOME | End: 2021-11-04
Payer: COMMERCIAL

## 2021-11-02 VITALS
DIASTOLIC BLOOD PRESSURE: 68 MMHG | HEART RATE: 72 BPM | SYSTOLIC BLOOD PRESSURE: 144 MMHG | RESPIRATION RATE: 16 BRPM | TEMPERATURE: 96.6 F

## 2021-11-02 DIAGNOSIS — I70.242 ATHEROSCLEROSIS OF NATIVE ARTERY OF LEFT LOWER EXTREMITY WITH ULCERATION OF CALF (HCC): Primary | ICD-10-CM

## 2021-11-02 DIAGNOSIS — C44.729 SQUAMOUS CELL CARCINOMA OF LOWER LEG, LEFT: ICD-10-CM

## 2021-11-02 DIAGNOSIS — R09.89 DECREASED DORSALIS PEDIS PULSE: ICD-10-CM

## 2021-11-02 DIAGNOSIS — R26.2 CANNOT WALK: ICD-10-CM

## 2021-11-02 DIAGNOSIS — L97.222 ULCER OF CALF WITH FAT LAYER EXPOSED, LEFT (HCC): ICD-10-CM

## 2021-11-02 DIAGNOSIS — I69.354 HEMIPARESIS OF LEFT NONDOMINANT SIDE AS LATE EFFECT OF CEREBRAL INFARCTION (HCC): ICD-10-CM

## 2021-11-02 DIAGNOSIS — I70.242 ATHEROSCLEROSIS OF NATIVE ARTERY OF LEFT LOWER EXTREMITY WITH ULCERATION OF CALF (HCC): ICD-10-CM

## 2021-11-02 DIAGNOSIS — C44.729 SQUAMOUS CELL CARCINOMA OF LOWER LEG, LEFT: Primary | ICD-10-CM

## 2021-11-02 PROCEDURE — 11042 DBRDMT SUBQ TIS 1ST 20SQCM/<: CPT

## 2021-11-02 PROCEDURE — 93923 UPR/LXTR ART STDY 3+ LVLS: CPT

## 2021-11-02 PROCEDURE — 99213 OFFICE O/P EST LOW 20 MIN: CPT | Performed by: SURGERY

## 2021-11-02 PROCEDURE — 11042 DBRDMT SUBQ TIS 1ST 20SQCM/<: CPT | Performed by: SURGERY

## 2021-11-02 PROCEDURE — 6370000000 HC RX 637 (ALT 250 FOR IP): Performed by: SURGERY

## 2021-11-02 PROCEDURE — 97597 DBRDMT OPN WND 1ST 20 CM/<: CPT

## 2021-11-02 RX ORDER — BETAMETHASONE DIPROPIONATE 0.05 %
OINTMENT (GRAM) TOPICAL ONCE
Status: CANCELLED | OUTPATIENT
Start: 2021-11-02 | End: 2021-11-02

## 2021-11-02 RX ORDER — GINSENG 100 MG
CAPSULE ORAL ONCE
Status: CANCELLED | OUTPATIENT
Start: 2021-11-02 | End: 2021-11-02

## 2021-11-02 RX ORDER — LIDOCAINE HYDROCHLORIDE 40 MG/ML
SOLUTION TOPICAL ONCE
Status: CANCELLED | OUTPATIENT
Start: 2021-11-02 | End: 2021-11-02

## 2021-11-02 RX ORDER — BACITRACIN ZINC AND POLYMYXIN B SULFATE 500; 1000 [USP'U]/G; [USP'U]/G
OINTMENT TOPICAL ONCE
Status: CANCELLED | OUTPATIENT
Start: 2021-11-02 | End: 2021-11-02

## 2021-11-02 RX ORDER — LIDOCAINE HYDROCHLORIDE 20 MG/ML
JELLY TOPICAL ONCE
Status: CANCELLED | OUTPATIENT
Start: 2021-11-02 | End: 2021-11-02

## 2021-11-02 RX ORDER — LIDOCAINE 50 MG/G
OINTMENT TOPICAL ONCE
Status: CANCELLED | OUTPATIENT
Start: 2021-11-02 | End: 2021-11-02

## 2021-11-02 RX ORDER — CLOBETASOL PROPIONATE 0.5 MG/G
OINTMENT TOPICAL ONCE
Status: CANCELLED | OUTPATIENT
Start: 2021-11-02 | End: 2021-11-02

## 2021-11-02 RX ORDER — GENTAMICIN SULFATE 1 MG/G
OINTMENT TOPICAL ONCE
Status: CANCELLED | OUTPATIENT
Start: 2021-11-02 | End: 2021-11-02

## 2021-11-02 RX ORDER — LIDOCAINE 40 MG/G
CREAM TOPICAL ONCE
Status: CANCELLED | OUTPATIENT
Start: 2021-11-02 | End: 2021-11-02

## 2021-11-02 RX ORDER — BACITRACIN, NEOMYCIN, POLYMYXIN B 400; 3.5; 5 [USP'U]/G; MG/G; [USP'U]/G
OINTMENT TOPICAL ONCE
Status: CANCELLED | OUTPATIENT
Start: 2021-11-02 | End: 2021-11-02

## 2021-11-02 RX ORDER — LIDOCAINE HYDROCHLORIDE 40 MG/ML
SOLUTION TOPICAL ONCE
Status: COMPLETED | OUTPATIENT
Start: 2021-11-02 | End: 2021-11-02

## 2021-11-02 RX ADMIN — LIDOCAINE HYDROCHLORIDE 8 ML: 40 SOLUTION TOPICAL at 08:55

## 2021-11-02 ASSESSMENT — PAIN DESCRIPTION - PAIN TYPE: TYPE: CHRONIC PAIN

## 2021-11-02 ASSESSMENT — PAIN - FUNCTIONAL ASSESSMENT: PAIN_FUNCTIONAL_ASSESSMENT: PREVENTS OR INTERFERES WITH ALL ACTIVE AND SOME PASSIVE ACTIVITIES

## 2021-11-02 ASSESSMENT — PAIN DESCRIPTION - PROGRESSION: CLINICAL_PROGRESSION: NOT CHANGED

## 2021-11-02 ASSESSMENT — PAIN DESCRIPTION - ORIENTATION: ORIENTATION: LEFT

## 2021-11-02 ASSESSMENT — PAIN DESCRIPTION - LOCATION: LOCATION: LEG

## 2021-11-02 ASSESSMENT — PAIN DESCRIPTION - DESCRIPTORS: DESCRIPTORS: ACHING;BURNING

## 2021-11-02 ASSESSMENT — PAIN SCALES - GENERAL: PAINLEVEL_OUTOF10: 9

## 2021-11-02 ASSESSMENT — PAIN DESCRIPTION - ONSET: ONSET: ON-GOING

## 2021-11-02 ASSESSMENT — PAIN DESCRIPTION - FREQUENCY: FREQUENCY: CONTINUOUS

## 2021-11-02 NOTE — PROGRESS NOTES
Wound Healing Center Followup Visit Note    Referring Physician : MD Sandeep Tucker 104 RECORD NUMBER:  [id number]  AGE: 67 y.o. GENDER: female  : 1945  EPISODE DATE:  2021    Subjective:     Chief Complaint   Patient presents with    Wound Check     left leg, buttocks      HISTORY of PRESENT ILLNESS HPI   April Ramon is a 67 y.o. female who presents today in regards to follow up evaluation and treatment of wound/ulcer. That patient's past medical, family and social hx were reviewed and changes were made if present. History of Wound Context:  The patient has had a wound of left calf, overlying the shin of the left leg, which was first noted approximately 6 months ago, tells me initially probably started as a scratch, with the [facility] lifting, subsequently has gotten worse. This has been treated by her wound care doctor at the facility, because of lack of improvement, patient's family insisted on coming to the wound care for further evaluation            On their initial visit to the wound healing center, 10/19/21, the patient has noted that the wound has not been improving. The patient has not had similar previous wounds in the past.       Patient sustained a stroke with left hemiparesis many years ago, has not ambulated since the time, has multiple other risk factors including coronary artery disease, cardiac bypass surgery, aortic and mitral valve replacement, diabetes mellitus, hypertension and history of TIA     Patient is a parenchyma is comfortable wheelchair, does not ambulate, thinly built     Pt is currently not on abx.         10/26/2021  · Discussed with the patient and patient's daughter Laura Sun who came with the patient regarding wound cultures, will prescribe, because of body weight, to 50 mg every 8 hours for 10 days based on the wound cultures  · Also because of atypical presentation of ulcer with evidence of exophytic growth of the wound, they have recommended biopsy excisional of the wound under local anesthesia, risks benefits were explained include bleeding complications which they understand and agree  · 1% lidocaine was infiltrated at the base of the wound, excisional debridement using a #10 blade was performed, over an area 1 to 2 cm at least with some normal skin being included  ·   Significant oozing noted, controlled with topical pressure subsequently silver nitrate sticks, and eventually a compression wrap was applied with Coban I came back, remove the Coban wrap, 30 minutes later, no further bleeding noted  · The wound will be dressed, for now till the next visit, with Xeroform gauze, 4 x 4's and a Kerlix wrap if necessary Coban wrap to control any bleeding  · Will wait for the rest of the lower extremity arterial Doppler study and the pathology report before making any additional recommendations  · All the questions were answered                11/2/2021  · The wound, surprisingly looks better after excisional biopsy of the ulcer of the left leg   · The pathology report was reviewed from the biopsy done last week, 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  · I had a long and detailed discussion with the patient's daughter Randi Vergara, telephone #[phone] regarding the biopsy report  · Inform her, that I would recommend an oncology consultation and then let them coordinate the care, including appropriate referral to different specialists for the management of the squamous cell carcinoma  · Informed her, grossly other nonspecific lymphadenopathy bilaterally in the inguinal area I do not see any significant lymphadenopathy  · Also inform her that have personally discussed with the vascular lab, they will try to do the lower extremity arterial Doppler study today  · We will follow her up in 2 weeks once artery Doppler studies completed to discuss results  · Informed them, that I will have our office contact blood and cancer center at the request of making arrangements  · All their questions were answered                    Wound/Ulcer Pain Timing/Severity: constant  Quality of pain: dull, aching  Severity:  3 / 10   Modifying Factors: None  Associated Signs/Symptoms: drainage and pain     Ulcer Identification:  Ulcer Type: arterial, non-healing/non-surgical and traumatic  Contributing Factors: arterial insufficiency and malnutrition     Diabetic/Pressure/Non Pressure Ulcers only:  Ulcer: Diabetic ulcer, fat layer exposed     If patient has diabetic lower extremity wounds  Hensley Classification of diabetic lower extremity wounds:     Grade Description   []? 0 No open wound   []? 1 Superficial ulcer involving the full skin thickness   []? 2 Deep ulcer involves ligament, tendon, joint capsule, or fascia  No bone involvement or abscess presence   []? 3 Deep Ulcer with abcess formation and/or osteomyelitis   []? 4 Localized gangrene   []? 5 Extensive gangrene of the foot      Wound: Patient does have a wound, 4 x 5 cm, with significant granulation tissue, does not appear to be typical traumatic wound ulcer, potential possibility of underlying malignancy may need to be considered, for today, debridement of the wound was performed superficially with wound cultures, next time, when the patient comes back next week, will consider wound biopsy under local infiltration lidocaine anesthesia with the patient and family agree     Other operative intervention:     1.   The emergency room notes will patient went to the emergency room on wound evaluation was reviewed, there are recommended the patient and family to go to the wound care center for additional work-up, prescription was given for Keflex and referred to wound care center     10/19/2021  · Discussed the patient and the patient's daughter Jaycee Velázquez who came with the patient telephone #[phone], recommended them, complete lower committee artery Doppler study because diminished pulses, x-ray of the tibia to make sure there is no underlying bone involvement, wound cultures were done, we will dress the wound for now with the Aquacel Ag and Tubigrip pending wound cultures and results of the lower extremity artery Doppler study  · Discussed the nursing staff, at the time of next visit, consider biopsy of the wound also because of the atypical presentation of the wound  At the request of the patient's family, prescription was given for Advil 200 mg p.o. 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 ([address] 10/19/2021    CAD (coronary artery disease)     Cannot walk 10/19/2021    Cerebrovascular disease     Decreased dorsalis pedis pulse 10/19/2021    Diabetes mellitus ([address]     Diverticulitis     dyslipidemia     Hemiparesis of left nondominant side as late effect of cerebrovascular disease ([address] 10/19/2021    Hypertension     Hypothyroidism     MI (myocardial infarction) ([address]     questionable 1986    Obesity     Osteoporosis     Squamous cell carcinoma of lower leg, left 11/2/2021    Thyroid disease     TIA (transient ischemic attack)     several post op TIAs    Type II or unspecified type diabetes mellitus without mention of complication, not stated as uncontrolled     Ulcer of calf with fat layer exposed, left ([address] 10/19/2021     Past Surgical History:   Procedure Laterality Date    APPENDECTOMY      CARDIAC CATHETERIZATION  6/06/12    Heart Lab, EF 65%, two vessel CAD, total prox RCA wtih left to right collaterals, prox OM branch 80% and mild CX 40%, severe aortic stenosis. Surgery consult advised for CABG and AVR. [facility] CARDIAC SURGERY  6/15/12    Epiaortic ultrasound scan. LISET. CABG x2 with reverse SVG to PDA, reverse SVG to second OMB.   Aortic tablet by mouth 2 times daily (with meals). 60 tablet 11     No current facility-administered medications on file prior to encounter. 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 ([address] - Primary    Relevant Orders    Initiate Outpatient Wound Care Protocol    Squamous cell carcinoma of lower leg, left    Cannot walk    Decreased dorsalis pedis pulse    Hemiparesis of left nondominant side as late effect of cerebrovascular disease (HCC)    Ulcer of calf with fat layer exposed, left ([address]    Relevant Orders    Initiate Outpatient Wound Care Protocol    Abimbola Del Cid MD, Hematology and Oncology, [place] ([facility])          Pre Debridement Measurements:  Are located in the [place]  Documentation Flow Sheet  Post Debridement Measurements:  Wound/Ulcer Descriptions are Pre Debridement except measurements:     Wound 10/19/21 Leg Anterior; Left #1 (Active)   Wound Image   10/19/21 0934   Wound Etiology Venous 10/19/21 0934   Dressing Status New dressing applied;Clean;Dry; Intact 10/26/21 1155   Wound Cleansed Cleansed with saline 10/26/21 1155   Dressing/Treatment Xeroform;ABD;Dry dressing 10/26/21 1155   Wound Length (cm) 2.9 cm 11/02/21 0849   Wound Width (cm) 3.9 cm 11/02/21 0849   Wound Depth (cm) 0.2 cm 11/02/21 0849   Wound Surface Area (cm^2) 11.31 cm^2 11/02/21 0849   Change in Wound Size % (l*w) 13.8 11/02/21 0849   Wound Volume (cm^3) 2.262 cm^3 11/02/21 0849   Wound Healing % -72 11/02/21 0849   Post-Procedure Length (cm) 2.9 cm 11/02/21 0855   Post-Procedure Width (cm) 3.9 cm 11/02/21 0855   Post-Procedure Depth (cm) 0.2 cm 11/02/21 0855   Post-Procedure Surface Area (cm^2) 11.31 cm^2 11/02/21 0855   Post-Procedure Volume (cm^3) 2.262 cm^3 11/02/21 0855   Wound Assessment Pink/red 11/02/21 0849   Drainage Amount Small 11/02/21 0849   Drainage Description Serosanguinous 11/02/21 0849   Odor None 11/02/21 0849   Maggie-wound Assessment Intact 11/02/21 0849   Number of days: 13       Wound 10/26/21 Buttocks Left #2 (Active)   Wound Image   10/26/21 1018   Dressing Status New dressing applied;Clean;Dry; Intact 10/26/21 1155   Wound Cleansed Cleansed with saline 10/26/21 1155   Dressing/Treatment Collagen;Dry dressing 10/26/21 1155   Wound Length (cm) 0.6 cm 11/02/21 0849   Wound Width (cm) 0.3 cm 11/02/21 0849   Wound Depth (cm) 0.1 cm 11/02/21 0849   Wound Surface Area (cm^2) 0.18 cm^2 11/02/21 0849   Change in Wound Size % (l*w) 59.09 11/02/21 0849   Wound Volume (cm^3) 0.018 cm^3 11/02/21 0849   Wound Healing % 59 11/02/21 0849   Wound Assessment Fibrin;Pink/red 11/02/21 0849   Drainage Amount Scant 11/02/21 0849   Drainage Description Serous 11/02/21 0849   Odor None 11/02/21 0849   Maggie-wound Assessment Intact 11/02/21 0849   Number of days: 6          Procedure Note  Indications:  Based on my examination of this patient's wound(s)/ulcer(s) today, debridement is required to promote healing and evaluate the wound base. Performed by: Natalio Villegas MD    Consent obtained:  Yes    Time out taken:  Yes    Pain Control:       Debridement:Excisional Debridement    Using curette and #15 blade scalpel the wound(s)/ulcer(s) was/were sharply debrided down through and including the removal of epidermis, dermis and subcutaneous tissue.         Devitalized Tissue Debrided:  fibrin and slough to stimulate bleeding to promote healing, post debridement good bleeding base and wound edges noted    Wound/Ulcer #: 1    Percent of Wound/Ulcer Debrided: 100%    Total Surface Area Debrided:  10 sq cm     Estimated Blood Loss:  Estimated amount of blood loss is 15ml. Hemostasis Achieved:  by pressure and by silver nitrate stick    Procedural Pain:  4  / 10   Post Procedural Pain:  2 / 10     Response to treatment:  Well tolerated by patient. Plan:   Treatment Note please see attached Discharge Instructions    Written patient dismissal instructions given to patient and signed by patient or POA. Discharge Instructions        Visit Discharge/Physician Orders     Discharge condition: Stable     Assessment of pain at discharge:minimal  Anesthetic used: 4% lidocaine     Discharge to: ECF     Left via:Private automobile     Accompanied by: accompanied by child     ECF/[facility]: [facility]ner- may take advil 200mg every 8 hours as needed for pain     Dressing Orders:left leg ulcer cleanse ulcer with normal saline apply xeroform and dry dressing daily      coccyx ulcer cleanse with normal saline apply promogran and foam boarder guaze every other day     Treatment Orders:  biopsy reviewed     Doppler study to be obtained in hospital     Eat foods high in protein and vitamin c     Take multivitamin daily   consult Oncology    [facility] followup visit ______2 weeks__________________  (Please note your next appointment above and if you are unable to keep, kindly give a 24 hour notice.  Thank you.)     Physician signature:__________________________        If you experience any of the following, please call the [facility] during business hours:     * Increase in Pain  * Temperature over 101  * Increase in drainage from your wound  * Drainage with a foul odor  * Bleeding  * Increase in swelling  * Need for compression bandage changes due to slippage, breakthrough drainage.     If you need medical attention outside of the business hours of the [facility] please contact your PCP or go to the nearest emergency room.                                Electronically signed by Zeb Waterman MD on 11/2/2021 at 9:17 AM

## 2021-11-16 ENCOUNTER — HOSPITAL ENCOUNTER (OUTPATIENT)
Dept: WOUND CARE | Age: 76
Discharge: HOME | End: 2021-11-16
Payer: COMMERCIAL

## 2021-11-16 VITALS
DIASTOLIC BLOOD PRESSURE: 68 MMHG | WEIGHT: 118 LBS | SYSTOLIC BLOOD PRESSURE: 122 MMHG | RESPIRATION RATE: 16 BRPM | HEART RATE: 64 BPM | TEMPERATURE: 97.6 F | BODY MASS INDEX: 23.05 KG/M2

## 2021-11-16 DIAGNOSIS — C44.729 SQUAMOUS CELL CARCINOMA OF LOWER LEG, LEFT: ICD-10-CM

## 2021-11-16 DIAGNOSIS — L97.222 ULCER OF CALF WITH FAT LAYER EXPOSED, LEFT (HCC): ICD-10-CM

## 2021-11-16 DIAGNOSIS — I70.242 ATHEROSCLEROSIS OF NATIVE ARTERY OF LEFT LOWER EXTREMITY WITH ULCERATION OF CALF (HCC): Primary | ICD-10-CM

## 2021-11-16 DIAGNOSIS — R26.2 CANNOT WALK: ICD-10-CM

## 2021-11-16 PROCEDURE — 6370000000 HC RX 637 (ALT 250 FOR IP): Performed by: SURGERY

## 2021-11-16 PROCEDURE — 99212 OFFICE O/P EST SF 10 MIN: CPT | Performed by: SURGERY

## 2021-11-16 PROCEDURE — 99213 OFFICE O/P EST LOW 20 MIN: CPT

## 2021-11-16 RX ORDER — DOCUSATE SODIUM 100 MG/1
100 CAPSULE, LIQUID FILLED ORAL 2 TIMES DAILY
COMMUNITY

## 2021-11-16 RX ORDER — LIDOCAINE HYDROCHLORIDE 40 MG/ML
SOLUTION TOPICAL ONCE
Status: COMPLETED | OUTPATIENT
Start: 2021-11-16 | End: 2021-11-16

## 2021-11-16 RX ORDER — ASCORBIC ACID 500 MG
500 TABLET ORAL DAILY
COMMUNITY

## 2021-11-16 RX ORDER — LIDOCAINE HYDROCHLORIDE 20 MG/ML
JELLY TOPICAL ONCE
Status: CANCELLED | OUTPATIENT
Start: 2021-11-16 | End: 2021-11-16

## 2021-11-16 RX ORDER — BACITRACIN ZINC AND POLYMYXIN B SULFATE 500; 1000 [USP'U]/G; [USP'U]/G
OINTMENT TOPICAL ONCE
Status: CANCELLED | OUTPATIENT
Start: 2021-11-16 | End: 2021-11-16

## 2021-11-16 RX ORDER — LIDOCAINE 40 MG/G
CREAM TOPICAL ONCE
Status: CANCELLED | OUTPATIENT
Start: 2021-11-16 | End: 2021-11-16

## 2021-11-16 RX ORDER — ONDANSETRON 4 MG/1
4 TABLET, FILM COATED ORAL EVERY 8 HOURS PRN
COMMUNITY

## 2021-11-16 RX ORDER — CLOBETASOL PROPIONATE 0.5 MG/G
OINTMENT TOPICAL ONCE
Status: CANCELLED | OUTPATIENT
Start: 2021-11-16 | End: 2021-11-16

## 2021-11-16 RX ORDER — DONEPEZIL HYDROCHLORIDE 5 MG/1
5 TABLET, FILM COATED ORAL NIGHTLY
COMMUNITY

## 2021-11-16 RX ORDER — TRAMADOL HYDROCHLORIDE 50 MG/1
50 TABLET ORAL EVERY 6 HOURS PRN
COMMUNITY
End: 2022-08-24 | Stop reason: ALTCHOICE

## 2021-11-16 RX ORDER — GINSENG 100 MG
CAPSULE ORAL ONCE
Status: CANCELLED | OUTPATIENT
Start: 2021-11-16 | End: 2021-11-16

## 2021-11-16 RX ORDER — ZINC SULFATE 50(220)MG
50 CAPSULE ORAL DAILY
COMMUNITY

## 2021-11-16 RX ORDER — BACITRACIN, NEOMYCIN, POLYMYXIN B 400; 3.5; 5 [USP'U]/G; MG/G; [USP'U]/G
OINTMENT TOPICAL ONCE
Status: CANCELLED | OUTPATIENT
Start: 2021-11-16 | End: 2021-11-16

## 2021-11-16 RX ORDER — LIDOCAINE 50 MG/G
OINTMENT TOPICAL ONCE
Status: CANCELLED | OUTPATIENT
Start: 2021-11-16 | End: 2021-11-16

## 2021-11-16 RX ORDER — LOSARTAN POTASSIUM 25 MG/1
25 TABLET ORAL DAILY
COMMUNITY

## 2021-11-16 RX ORDER — LANOLIN ALCOHOL/MO/W.PET/CERES
3 CREAM (GRAM) TOPICAL DAILY
COMMUNITY

## 2021-11-16 RX ORDER — M-VIT,TX,IRON,MINS/CALC/FOLIC 27MG-0.4MG
1 TABLET ORAL DAILY
COMMUNITY

## 2021-11-16 RX ORDER — PANTOPRAZOLE SODIUM 40 MG/1
40 TABLET, DELAYED RELEASE ORAL DAILY
COMMUNITY

## 2021-11-16 RX ORDER — GENTAMICIN SULFATE 1 MG/G
OINTMENT TOPICAL ONCE
Status: CANCELLED | OUTPATIENT
Start: 2021-11-16 | End: 2021-11-16

## 2021-11-16 RX ORDER — AMLODIPINE BESYLATE 2.5 MG/1
2.5 TABLET ORAL DAILY
COMMUNITY

## 2021-11-16 RX ORDER — LIDOCAINE HYDROCHLORIDE 40 MG/ML
SOLUTION TOPICAL ONCE
Status: CANCELLED | OUTPATIENT
Start: 2021-11-16 | End: 2021-11-16

## 2021-11-16 RX ORDER — CYCLOBENZAPRINE HCL 5 MG
5 TABLET ORAL 3 TIMES DAILY PRN
COMMUNITY

## 2021-11-16 RX ORDER — BETAMETHASONE DIPROPIONATE 0.05 %
OINTMENT (GRAM) TOPICAL ONCE
Status: CANCELLED | OUTPATIENT
Start: 2021-11-16 | End: 2021-11-16

## 2021-11-16 RX ADMIN — LIDOCAINE HYDROCHLORIDE 10 ML: 40 SOLUTION TOPICAL at 09:39

## 2021-11-16 ASSESSMENT — PAIN DESCRIPTION - PAIN TYPE: TYPE: CHRONIC PAIN

## 2021-11-16 ASSESSMENT — PAIN DESCRIPTION - LOCATION: LOCATION: LEG

## 2021-11-16 ASSESSMENT — PAIN SCALES - GENERAL: PAINLEVEL_OUTOF10: 8

## 2021-11-16 NOTE — PROGRESS NOTES
Wound Healing Center Followup Visit Note    Referring Physician : MD Sandeep Benoit 104 RECORD NUMBER:  [id number]  AGE: 67 y.o. GENDER: female  : 1945  EPISODE DATE:  2021    Subjective:     Chief Complaint   Patient presents with    Wound Check     Left leg and Left buttocks      HISTORY of PRESENT ILLNESS HPI   Tamanna Mena is a 67 y.o. female who presents today in regards to follow up evaluation and treatment of wound/ulcer. That patient's past medical, family and social hx were reviewed and changes were made if present. History of Wound Context:  The patient has had a wound of left calf, overlying the shin of the left leg, which was first noted approximately 6 months ago, tells me initially probably started as a scratch, with the [facility] lifting, subsequently has gotten worse. This has been treated by her wound care doctor at the facility, because of lack of improvement, patient's family insisted on coming to the wound care for further evaluation            On their initial visit to the wound healing center, 10/19/21, the patient has noted that the wound has not been improving. The patient has not had similar previous wounds in the past.       Patient sustained a stroke with left hemiparesis many years ago, has not ambulated since the time, has multiple other risk factors including coronary artery disease, cardiac bypass surgery, aortic and mitral valve replacement, diabetes mellitus, hypertension and history of TIA     Patient is a parenchyma is comfortable wheelchair, does not ambulate, thinly built     Pt is currently not on abx.         10/26/2021  · Discussed with the patient and patient's daughter Wagner Jacques who came with the patient regarding wound cultures, will prescribe, because of body weight, to 50 mg every 8 hours for 10 days based on the wound cultures  · Also because of atypical presentation of ulcer with evidence of exophytic growth of the wound, they have recommended biopsy excisional of the wound under local anesthesia, risks benefits were explained include bleeding complications which they understand and agree  · 1% lidocaine was infiltrated at the base of the wound, excisional debridement using a #10 blade was performed, over an area 1 to 2 cm at least with some normal skin being included  ·   Significant oozing noted, controlled with topical pressure subsequently silver nitrate sticks, and eventually a compression wrap was applied with Coban I came back, remove the Coban wrap, 30 minutes later, no further bleeding noted  · The wound will be dressed, for now till the next visit, with Xeroform gauze, 4 x 4's and a Kerlix wrap if necessary Coban wrap to control any bleeding  · Will wait for the rest of the lower extremity arterial Doppler study and the pathology report before making any additional recommendations  · All the questions were answered                11/2/2021  · The wound, surprisingly looks better after excisional biopsy of the ulcer of the left leg   · The pathology report was reviewed from the biopsy done last week, 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  · I had a long and detailed discussion with the patient's daughter Jagdeep Cosme, telephone #[phone] regarding the biopsy report  · Inform her, that I would recommend an oncology consultation and then let them coordinate the care, including appropriate referral to different specialists for the management of the squamous cell carcinoma  · Informed her, grossly other nonspecific lymphadenopathy bilaterally in the inguinal area I do not see any significant lymphadenopathy  · Also inform her that have personally discussed with the vascular lab, they will try to do the lower extremity arterial Doppler study today  · We will follow her up in 2 weeks once artery Doppler studies completed to discuss results  · Informed them, that I will have our office contact blood and patient and family agree     Other operative intervention:     1.   The emergency room notes will patient went to the emergency room on wound evaluation was reviewed, there are recommended the patient and family to go to the wound care center for additional work-up, prescription was given for Keflex and referred to wound care center     10/19/2021  · Discussed the patient and the patient's daughter Yisel Donato who came with the patient telephone #[phone], recommended them, complete lower committee artery Doppler study because diminished pulses, x-ray of the tibia to make sure there is no underlying bone involvement, wound cultures were done, we will dress the wound for now with the Aquacel Ag and Tubigrip pending wound cultures and results of the lower extremity artery Doppler study  · Discussed the nursing staff, at the time of next visit, consider biopsy of the wound also because of the atypical presentation of the wound  At the request of the patient's family, prescription was given for Advil 200 mg p.o. 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 ([address] 10/19/2021    CAD (coronary artery disease)     Cannot walk 10/19/2021    Cerebrovascular disease     Decreased dorsalis pedis pulse 10/19/2021    Diabetes mellitus ([address]     Diverticulitis     dyslipidemia     Hemiparesis of left nondominant side as late effect of cerebrovascular disease ([address] 10/19/2021    Hypertension     Hypothyroidism     MI (myocardial infarction) ([address]     questionable 1986    Obesity     Osteoporosis     Squamous cell carcinoma of lower leg, left 11/2/2021    Thyroid disease     TIA (transient ischemic attack)     several post op TIAs    Type II or unspecified type diabetes mellitus without mention of complication, not stated as uncontrolled     Ulcer of calf with fat layer exposed, left ([address] 10/19/2021     Past Surgical History:   Procedure Laterality Date    APPENDECTOMY      CARDIAC CATHETERIZATION  12    Heart Lab, EF 65%, two vessel CAD, total prox RCA wtih left to right collaterals, prox OM branch 80% and mild CX 40%, severe aortic stenosis. Surgery consult advised for CABG and AVR. [facility] CARDIAC SURGERY  6/15/12    Epiaortic ultrasound scan. LISET. CABG x2 with reverse SVG to PDA, reverse SVG to second OMB. Aortic valve replacement with size 21 Mosaic ultra porcine heart valve.   Mitral valve repair with insertion of size 28 future annuloplasty band, [address] VALVE REPLACEMENT  2012    AVR size 21 mosaic ultra porcine valve     CHOLECYSTECTOMY      CORONARY ARTERY BYPASS GRAFT  6/15/12    DIAGNOSTIC CARDIAC CATH LAB PROCEDURE      DILATATION, ESOPHAGUS      HERNIA REPAIR      HYSTERECTOMY      age 43    TONSILLECTOMY      VASCULAR SURGERY       Family History   Problem Relation Age of Onset    High Blood Pressure Father     Heart Disease Father     Heart Disease Brother     High Blood Pressure Brother      Social History     Tobacco Use    Smoking status: Former Smoker     Packs/day: 0.50     Quit date: 1970     Years since quittin.4    Smokeless tobacco: Never Used   Substance Use Topics    Alcohol use: No    Drug use: No     Allergies   Allergen Reactions    Phenergan [Promethazine Hcl]      Current Outpatient Medications on File Prior to Encounter   Medication Sig Dispense Refill    donepezil (ARICEPT) 5 MG tablet Take 5 mg by mouth nightly      ascorbic acid (VITAMIN C) 500 MG tablet Take 500 mg by mouth daily      docusate sodium (COLACE) 100 MG capsule Take 100 mg by mouth 2 times daily      cyclobenzaprine (FLEXERIL) 5 MG tablet Take 5 mg by mouth 3 times daily as needed for Muscle spasms      losartan (COZAAR) 25 MG tablet Take 25 mg by mouth daily      melatonin 3 MG TABS tablet Take 3 mg by mouth daily      amLODIPine (NORVASC) 2.5 MG tablet Take 2.5 mg by mouth daily      pantoprazole (PROTONIX) 40 MG tablet Take 40 mg by mouth daily      traMADol (ULTRAM) 50 MG tablet Take 50 mg by mouth every 6 hours as needed for Pain.  zinc sulfate (ZINCATE) 220 (50 Zn) MG capsule Take 50 mg by mouth daily      ondansetron (ZOFRAN) 4 MG tablet Take 4 mg by mouth every 8 hours as needed for Nausea or Vomiting      Multiple Vitamins-Minerals (THERAPEUTIC MULTIVITAMIN-MINERALS) tablet Take 1 tablet by mouth daily      ibuprofen (ADVIL) 200 MG tablet Take 1 tablet by mouth every 8 hours as needed for Pain 120 tablet 3    CRESTOR 10 MG tablet Take 1 tablet by mouth daily. 30 tablet 6    levothyroxine (SYNTHROID) 50 MCG tablet Take 50 mcg by mouth daily.  carvedilol (COREG) 3.125 MG tablet Take 3.125 mg by mouth 2 times daily (with meals).  sertraline (ZOLOFT) 100 MG tablet Take 100 mg by mouth daily. One and a half a day      insulin glargine (LANTUS) 100 UNIT/ML injection Inject 30 Units into the skin 2 times daily. No current facility-administered medications on file prior to encounter.        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 ([facility] [address] - Primary    Relevant Orders    Initiate Outpatient Wound Care Protocol    Squamous cell carcinoma of lower leg, left    Relevant Medications    traMADol (ULTRAM) 50 MG tablet    Cannot walk    Ulcer of calf with fat layer exposed, left [facility])    Relevant Orders    Initiate Outpatient Wound Care Protocol          Pre Debridement Measurements:  Are located in the [place]  Documentation Flow Sheet  Post Debridement Measurements:  Wound/Ulcer Descriptions are Pre Debridement except measurements:     Wound 10/19/21 Leg Anterior; Left #1 (Active)   Wound Image   11/16/21 0926   Wound Etiology Venous 10/19/21 0934   Dressing Status New dressing applied; Clean; Dry; Intact 11/02/21 0923   Wound Cleansed Cleansed with saline 11/02/21 0923   Dressing/Treatment Xeroform; Dry dressing 11/02/21 0923   Wound Length (cm) 3.4 cm 11/16/21 0926   Wound Width (cm) 3.5 cm 11/16/21 0926   Wound Depth (cm) 0.1 cm 11/16/21 0926   Wound Surface Area (cm^2) 11.9 cm^2 11/16/21 0926   Change in Wound Size % (l*w) 9.3 11/16/21 0926   Wound Volume (cm^3) 1.19 cm^3 11/16/21 0926   Wound Healing % 9 11/16/21 0926   Post-Procedure Length (cm) 2.9 cm 11/02/21 0855   Post-Procedure Width (cm) 3.9 cm 11/02/21 0855   Post-Procedure Depth (cm) 0.2 cm 11/02/21 0855   Post-Procedure Surface Area (cm^2) 11.31 cm^2 11/02/21 0855   Post-Procedure Volume (cm^3) 2.262 cm^3 11/02/21 0855   Wound Assessment Fibrin; Hyper granulation tissue; Granulation tissue 11/16/21 0926   Drainage Amount Small 11/16/21 0926   Drainage Description Serosanguinous 11/16/21 0926   Odor None 11/16/21 0926   Maggie-wound Assessment Fragile;  Maceration 11/16/21 0926   Number of days: 28       Wound 10/26/21 Buttocks Left #2 (Active)   Wound Image   11/16/21 0930   Dressing Status New dressing applied; Clean; Dry; Intact 11/02/21 0923   Wound Cleansed Cleansed with saline 11/02/21 0923   Dressing/Treatment Collagen; Dry dressing 11/02/21 0923   Wound Length (cm) 0 cm 11/16/21 0930   Wound Width (cm) 0 cm 11/16/21 0930   Wound Depth (cm) 0 cm 11/16/21 0930   Wound Surface Area experience any of the following, please call the [facility] during business hours:     * Increase in Pain  * Temperature over 101  * Increase in drainage from your wound  * Drainage with a foul odor  * Bleeding  * Increase in swelling  * Need for compression bandage changes due to slippage, breakthrough drainage.     If you need medical attention outside of the business hours of the [employer] please contact your PCP or go to the nearest emergency room.                                                     Electronically signed by Donna Nichole MD on 11/16/2021 at 10:00 AM

## 2021-11-30 ENCOUNTER — HOSPITAL ENCOUNTER (OUTPATIENT)
Dept: WOUND CARE | Age: 76
Discharge: HOME | End: 2021-11-30
Payer: COMMERCIAL

## 2021-11-30 VITALS
HEIGHT: 60 IN | RESPIRATION RATE: 16 BRPM | TEMPERATURE: 96.8 F | WEIGHT: 118 LBS | BODY MASS INDEX: 23.16 KG/M2 | HEART RATE: 64 BPM | SYSTOLIC BLOOD PRESSURE: 156 MMHG | DIASTOLIC BLOOD PRESSURE: 60 MMHG

## 2021-11-30 DIAGNOSIS — L97.222 ULCER OF CALF WITH FAT LAYER EXPOSED, LEFT (HCC): ICD-10-CM

## 2021-11-30 DIAGNOSIS — C44.729 SQUAMOUS CELL CARCINOMA OF LOWER LEG, LEFT: ICD-10-CM

## 2021-11-30 DIAGNOSIS — I70.242 ATHEROSCLEROSIS OF NATIVE ARTERY OF LEFT LOWER EXTREMITY WITH ULCERATION OF CALF (HCC): Primary | ICD-10-CM

## 2021-11-30 PROCEDURE — 99212 OFFICE O/P EST SF 10 MIN: CPT | Performed by: SURGERY

## 2021-11-30 PROCEDURE — 87075 CULTR BACTERIA EXCEPT BLOOD: CPT

## 2021-11-30 PROCEDURE — 99213 OFFICE O/P EST LOW 20 MIN: CPT

## 2021-11-30 PROCEDURE — 87077 CULTURE AEROBIC IDENTIFY: CPT

## 2021-11-30 PROCEDURE — 87186 SC STD MICRODIL/AGAR DIL: CPT

## 2021-11-30 PROCEDURE — 87070 CULTURE OTHR SPECIMN AEROBIC: CPT

## 2021-11-30 RX ORDER — BACITRACIN ZINC AND POLYMYXIN B SULFATE 500; 1000 [USP'U]/G; [USP'U]/G
OINTMENT TOPICAL ONCE
OUTPATIENT
Start: 2021-11-30 | End: 2021-11-30

## 2021-11-30 RX ORDER — BETAMETHASONE DIPROPIONATE 0.05 %
OINTMENT (GRAM) TOPICAL ONCE
OUTPATIENT
Start: 2021-11-30 | End: 2021-11-30

## 2021-11-30 RX ORDER — LIDOCAINE 50 MG/G
OINTMENT TOPICAL ONCE
OUTPATIENT
Start: 2021-11-30 | End: 2021-11-30

## 2021-11-30 RX ORDER — LIDOCAINE HYDROCHLORIDE 20 MG/ML
JELLY TOPICAL ONCE
OUTPATIENT
Start: 2021-11-30 | End: 2021-11-30

## 2021-11-30 RX ORDER — GINSENG 100 MG
CAPSULE ORAL ONCE
OUTPATIENT
Start: 2021-11-30 | End: 2021-11-30

## 2021-11-30 RX ORDER — LIDOCAINE 40 MG/G
CREAM TOPICAL ONCE
OUTPATIENT
Start: 2021-11-30 | End: 2021-11-30

## 2021-11-30 RX ORDER — GENTAMICIN SULFATE 1 MG/G
OINTMENT TOPICAL ONCE
OUTPATIENT
Start: 2021-11-30 | End: 2021-11-30

## 2021-11-30 RX ORDER — LIDOCAINE HYDROCHLORIDE 40 MG/ML
SOLUTION TOPICAL ONCE
OUTPATIENT
Start: 2021-11-30 | End: 2021-11-30

## 2021-11-30 RX ORDER — LIDOCAINE HYDROCHLORIDE 40 MG/ML
SOLUTION TOPICAL ONCE
Status: COMPLETED | OUTPATIENT
Start: 2021-11-30 | End: 2021-11-30

## 2021-11-30 RX ORDER — BACITRACIN, NEOMYCIN, POLYMYXIN B 400; 3.5; 5 [USP'U]/G; MG/G; [USP'U]/G
OINTMENT TOPICAL ONCE
OUTPATIENT
Start: 2021-11-30 | End: 2021-11-30

## 2021-11-30 RX ORDER — CLOBETASOL PROPIONATE 0.5 MG/G
OINTMENT TOPICAL ONCE
OUTPATIENT
Start: 2021-11-30 | End: 2021-11-30

## 2021-11-30 RX ADMIN — LIDOCAINE HYDROCHLORIDE: 40 SOLUTION TOPICAL at 09:06

## 2021-12-01 NOTE — PROGRESS NOTES
Wound Healing Center Followup Visit Note    Referring Physician : MD Sandeep Mustafa 104 RECORD NUMBER:  [id number]  AGE: 67 y.o. GENDER: female  : 1945  EPISODE DATE:  2021    Subjective:     Chief Complaint   Patient presents with    Wound Check     LEFT LEG ULCER      HISTORY of PRESENT ILLNESS MIKE Ayers is a 67 y.o. female who presents today in regards to follow up evaluation and treatment of wound/ulcer. That patient's past medical, family and social hx were reviewed and changes were made if present. History of Wound Context:  The patient has had a wound of left calf, overlying the shin of the left leg, which was first noted approximately 6 months ago, tells me initially probably started as a scratch, with the [facility] lifting, subsequently has gotten worse. This has been treated by her wound care doctor at the facility, because of lack of improvement, patient's family insisted on coming to the wound care for further evaluation            On their initial visit to the wound healing center, 10/19/21, the patient has noted that the wound has not been improving. The patient has not had similar previous wounds in the past.       Patient sustained a stroke with left hemiparesis many years ago, has not ambulated since the time, has multiple other risk factors including coronary artery disease, cardiac bypass surgery, aortic and mitral valve replacement, diabetes mellitus, hypertension and history of TIA     Patient is a parenchyma is comfortable wheelchair, does not ambulate, thinly built     Pt is currently not on abx.         10/26/2021  · Discussed with the patient and patient's daughter Mayra Tomlin who came with the patient regarding wound cultures, will prescribe, because of body weight, to 50 mg every 8 hours for 10 days based on the wound cultures  · Also because of atypical presentation of ulcer with evidence of exophytic growth of the wound, they have recommended biopsy excisional of the wound under local anesthesia, risks benefits were explained include bleeding complications which they understand and agree  · 1% lidocaine was infiltrated at the base of the wound, excisional debridement using a #10 blade was performed, over an area 1 to 2 cm at least with some normal skin being included  ·   Significant oozing noted, controlled with topical pressure subsequently silver nitrate sticks, and eventually a compression wrap was applied with Coban I came back, remove the Coban wrap, 30 minutes later, no further bleeding noted  · The wound will be dressed, for now till the next visit, with Xeroform gauze, 4 x 4's and a Kerlix wrap if necessary Coban wrap to control any bleeding  · Will wait for the rest of the lower extremity arterial Doppler study and the pathology report before making any additional recommendations  · All the questions were answered                11/2/2021  · The wound, surprisingly looks better after excisional biopsy of the ulcer of the left leg   · The pathology report was reviewed from the biopsy done last week, 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  · I had a long and detailed discussion with the patient's daughter Lukas Vann, telephone #[phone] regarding the biopsy report  · Inform her, that I would recommend an oncology consultation and then let them coordinate the care, including appropriate referral to different specialists for the management of the squamous cell carcinoma  · Informed her, grossly other nonspecific lymphadenopathy bilaterally in the inguinal area I do not see any significant lymphadenopathy  · Also inform her that have personally discussed with the vascular lab, they will try to do the lower extremity arterial Doppler study today  · We will follow her up in 2 weeks once artery Doppler studies completed to discuss results  · Informed them, that I will have our office contact blood and cancer center at the request of making arrangements  · All their questions were answered      11/16/2021  · Ulcer, of the shin of the left leg looks better, status post excisional biopsy of the lesion, squamous cell carcinoma  · Discussed the patient and her daughter, have an appointment to see their oncologist Friday  · Inform them, based upon the oncology's opinion and recommendations, will decide regarding need for vascular intervention, potentially adequate collaterals for healing at that level, if not, may require angiogram intervention, deferring because the patient is not ambulatory, frail, high risk, wound not debrided today      11/30/2021  · The ulcer over the shin of the left leg, some growth noted, due to her squamous cell carcinoma, slight erythema on the skin  · Wound cultures done  · Discussed the patient and her daughter, she already saw her oncologist, waiting to see her plastic surgeon for possible excision and skin grafting of the ulcer  · Discussed the patient and her daughter again, no need for follow-up with the wound care center, but to make sure, that the plastic surgeon knows, that she has some arterial compromise of the left leg, you have any questions to call me, may need to consider vascular intervention of the left leg but will need absolutely surgical very frail condition of patient with significant weight loss, and not ambulatory at the present time  · No follow-up appointment was given but to call me as needed      Wound/Ulcer Pain Timing/Severity: constant  Quality of pain: dull, aching  Severity:  3 / 10   Modifying Factors: None  Associated Signs/Symptoms: drainage and pain     Ulcer Identification:  Ulcer Type: arterial, non-healing/non-surgical and traumatic  Contributing Factors: arterial insufficiency and malnutrition     Diabetic/Pressure/Non Pressure Ulcers only:  Ulcer: Diabetic ulcer, fat layer exposed     If patient has diabetic lower extremity wounds  Hensley Classification of PAST MEDICAL HISTORY      Diagnosis Date    Abnormal stress ECG     Aortic stenosis     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 ([address] 10/19/2021    CAD (coronary artery disease)     Cannot walk 10/19/2021    Cerebrovascular disease     Decreased dorsalis pedis pulse 10/19/2021    Diabetes mellitus ([address]     Diverticulitis     dyslipidemia     Hemiparesis of left nondominant side as late effect of cerebrovascular disease ([address] 10/19/2021    Hypertension     Hypothyroidism     MI (myocardial infarction) ([address]     questionable 1986    Obesity     Osteoporosis     Squamous cell carcinoma of lower leg, left 11/2/2021    Thyroid disease     TIA (transient ischemic attack)     several post op TIAs    Type II or unspecified type diabetes mellitus without mention of complication, not stated as uncontrolled     Ulcer of calf with fat layer exposed, left ([address] 10/19/2021     Past Surgical History:   Procedure Laterality Date    APPENDECTOMY      CARDIAC CATHETERIZATION  6/06/12    Heart Lab, EF 65%, two vessel CAD, total prox RCA wtih left to right collaterals, prox OM branch 80% and mild CX 40%, severe aortic stenosis. Surgery consult advised for CABG and AVR. Kathi Tiera CARDIAC SURGERY  6/15/12    Epiaortic ultrasound scan. LISET. CABG x2 with reverse SVG to PDA, reverse SVG to second OMB. Aortic valve replacement with size 21 Mosaic ultra porcine heart valve.   Mitral valve repair with insertion of size 28 future annuloplasty band, [address] VALVE REPLACEMENT  June 2012    AVR size 21 mosaic ultra porcine valve     CHOLECYSTECTOMY      CORONARY ARTERY BYPASS GRAFT  6/15/12    DIAGNOSTIC CARDIAC CATH LAB PROCEDURE      DILATATION, ESOPHAGUS      HERNIA REPAIR      HYSTERECTOMY      age 43    TONSILLECTOMY      VASCULAR SURGERY       Family History   Problem Relation Age of Onset    High Blood Pressure Father     Heart Disease Father     Heart No current facility-administered medications on file prior to encounter. REVIEW OF SYSTEMS See HPI    Objective:    BP (!) 156/60   Pulse 64   Temp 96.8 °F (36 °C) (Temporal)   Resp 16   Ht 5' (1.524 m)   Wt 118 lb (53.5 kg)   BMI 23.05 kg/m²   Wt Readings from Last 3 Encounters:   11/30/21 118 lb (53.5 kg)   11/16/21 118 lb (53.5 kg)   08/13/13 166 lb (75.3 kg)     PHYSICAL EXAM  CONSTITUTIONAL:   Awake, alert, cooperative   EYES:  lids and lashes normal   ENT: external ears and nose without lesions   NECK:  supple, symmetrical, trachea midline   SKIN:  Open wound Present    Assessment:     Problem List Items Addressed This Visit     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 ([address] - Primary    Relevant Orders    Initiate Outpatient Wound Care Protocol    Squamous cell carcinoma of lower leg, left    Ulcer of calf with fat layer exposed, left [facility])    Relevant Orders    Initiate Outpatient Wound Care Protocol          Pre Debridement Measurements:  Are located in the [facility]  Documentation Flow Sheet  Post Debridement Measurements:  Wound/Ulcer Descriptions are Pre Debridement except measurements:     Wound 10/19/21 Leg Anterior; Left #1 (Active)   Wound Image   11/16/21 0926   Wound Etiology Venous 10/19/21 0934   Dressing Status New dressing applied 11/30/21 0947   Wound Cleansed Cleansed with saline 11/30/21 0947   Dressing/Treatment Xeroform; Dry dressing 11/30/21 0947   Offloading for Diabetic Foot Ulcers No offloading required 11/30/21 0947   Wound Length (cm) 3.6 cm 11/30/21 0907   Wound Width (cm) 4 cm 11/30/21 0907   Wound Depth (cm) 0.1 cm 11/30/21 0907   Wound Surface Area (cm^2) 14.4 cm^2 11/30/21 0907   Change in Wound Size % (l*w) -9.76 11/30/21 0907   Wound Volume (cm^3) 1.44 cm^3 11/30/21 0907   Wound Healing % -10 11/30/21 0907   Post-Procedure Length (cm) 2.9 cm 11/02/21 0855   Post-Procedure Width (cm) 3.9 cm 11/02/21 0855   Post-Procedure Depth (cm) 0.2 cm 11/02/21 0855   Post-Procedure Surface Area (cm^2) 11.31 cm^2 11/02/21 0855   Post-Procedure Volume (cm^3) 2.262 cm^3 11/02/21 0855   Wound Assessment Fibrin; Hyper granulation tissue; Granulation tissue 11/30/21 0907   Drainage Amount Moderate 11/30/21 0907   Drainage Description Thin; Yellow 11/30/21 0907   Odor None 11/30/21 0907   Maggie-wound Assessment Fragile; Maceration 11/30/21 0455   Number of days: 42          Procedure Note  Indications:  Based on my examination of this patient's wound(s)/ulcer(s) today, debridement is required to promote healing and evaluate the wound base. Performed by: Juany Avendano MD    Consent obtained:  Yes    Time out taken:  Yes    Pain Control: Anesthetic  Anesthetic: 4% Lidocaine Liquid Topical     Debridement: Wound not debrided today      Response to treatment:  Well tolerated by patient. Plan:   Treatment Note please see attached Discharge Instructions    Written patient dismissal instructions given to patient and signed by patient or POA. Discharge Instructions        Visit Discharge/Physician Orders     Discharge condition: Stable     Assessment of pain at discharge:minimal  Anesthetic used: 4% lidocaine     Discharge to: ECF     Left via:Private automobile     Accompanied by: accompanied by child     ECF/[facility]: [facility]- may take advil 200mg every 8 hours as needed for pain     Dressing Orders:left leg ulcer cleanse ulcer with normal saline apply xeroform and dry dressing daily     Left buttocks ulcer pad and protect with foam boarder guaze change weekly and as needed     Treatment Orders:  biopsy reviewed     Doppler study reviewed     Eat foods high in protein and vitamin c     Take multivitamin daily      culture obtained     [facility] followup visit ______as needed_________________  (Please note your next appointment above and if you are unable to keep, kindly give a 24 hour notice.  Thank you.)     Physician signature:__________________________        If you experience any of the following, please call the [address] during business hours:     * Increase in Pain  * Temperature over 101  * Increase in drainage from your wound  * Drainage with a foul odor  * Bleeding  * Increase in swelling  * Need for compression bandage changes due to slippage, breakthrough drainage.     If you need medical attention outside of the business hours of the [address] please contact your PCP or go to the nearest emergency room.                                                                          Electronically signed by Maddison Mata MD on 11/30/2021 at 8:25 PM

## 2021-12-03 LAB — ANAEROBIC CULTURE: NORMAL

## 2021-12-04 LAB
ORGANISM: ABNORMAL
ORGANISM: ABNORMAL
WOUND/ABSCESS: ABNORMAL
WOUND/ABSCESS: ABNORMAL

## 2021-12-23 ENCOUNTER — HOSPITAL ENCOUNTER (OUTPATIENT)
Age: 76
Discharge: HOME | End: 2021-12-25

## 2021-12-23 PROCEDURE — 88305 TISSUE EXAM BY PATHOLOGIST: CPT

## 2022-04-29 ENCOUNTER — HOSPITAL ENCOUNTER (OUTPATIENT)
Dept: GENERAL RADIOLOGY | Age: 77
Discharge: HOME | End: 2022-05-01
Payer: COMMERCIAL

## 2022-04-29 DIAGNOSIS — R10.84 ABDOMINAL PAIN, GENERALIZED: ICD-10-CM

## 2022-04-29 DIAGNOSIS — R13.19 CONSTANT LOW-GRADE DYSPHAGIA: ICD-10-CM

## 2022-04-29 PROCEDURE — 92526 ORAL FUNCTION THERAPY: CPT | Performed by: SPEECH-LANGUAGE PATHOLOGIST

## 2022-04-29 PROCEDURE — 2500000003 HC RX 250 WO HCPCS: Performed by: INTERNAL MEDICINE

## 2022-04-29 PROCEDURE — 74230 X-RAY XM SWLNG FUNCJ C+: CPT

## 2022-04-29 PROCEDURE — 92611 MOTION FLUOROSCOPY/SWALLOW: CPT | Performed by: SPEECH-LANGUAGE PATHOLOGIST

## 2022-04-29 RX ADMIN — BARIUM SULFATE 45 G: 0.81 POWDER, FOR SUSPENSION ORAL at 10:49

## 2022-04-29 RX ADMIN — BARIUM SULFATE 45 ML: 400 PASTE ORAL at 10:49

## 2022-04-29 RX ADMIN — BARIUM SULFATE 45 ML: 400 SUSPENSION ORAL at 10:49

## 2022-04-29 NOTE — PROGRESS NOTES
SPEECH/LANGUAGE PATHOLOGY  VIDEOFLUOROSCOPIC STUDY OF SWALLOWING (MBS)   and PLAN OF CARE    PATIENT NAME:  Ray Swanson  (female)     MRN:  [id number]    :  1945  (67 y.o.)  STATUS:  Inpatient: Room Room/bed info not found    TODAY'S DATE:  2022  REFERRING PROVIDER:   Merritt King DO    SPECIFIC PROVIDER ORDER: FL modified barium swallow with video  Date of order:  22   REASON FOR REFERRAL: Pt for entire GI workup (MBSS/ esophogram and UGI) per order. Pt's daughter reports that she has no symptoms of phx dysphagia during meals however afterwards she always presents with coughing up large amounts of thick, stringy mucous (was observed post completion of MBSS). Hx of CVA.      EVALUATING THERAPIST: CESAR Antoine      RESULTS:      DYSPHAGIA DIAGNOSIS:  normal swallow function for age/ premorbid functioning      DIET RECOMMENDATIONS:  Easy to chew consistency solids (IDDSI level 7, transitional)  with  thin liquids (IDDSI level 0), MEDICATION ADMINISTRATION and Per patient choice/nursing judgement    FEEDING RECOMMENDATIONS:    Assistance level:  Stand by assistance and Supervision is needed during all oral intake, Set-up is required for all oral intake, Encourage self-feeding     Compensatory strategies recommended: No strategies are recommended at this time     Discussed recommendations with nursing and/or faxed report to referring provider: Yes    SPEECH THERAPY  PLAN OF CARE   The dysphagia POC is established based on physician order and dysphagia diagnosis    Dysphagia therapy is not recommended       Conditions Requiring Skilled Therapeutic Intervention for dysphagia:    not applicable    SPECIFIC DYSPHAGIA INTERVENTIONS TO INCLUDE:     Not applicable    Specific instructions for next treatment:  not applicable   Treatment Goals:    Short Term Goals:  Not applicable no therapy warranted     Long Term Goals:   Not applicable no therapy warranted      Patient/family Goal:    not applicable                    ADMITTING DIAGNOSIS: Abdominal pain, generalized [R10.84]  Constant low-grade dysphagia [R13.19]     VISIT DIAGNOSIS:   Visit Diagnoses       Codes    Abdominal pain, generalized     R10.84    Constant low-grade dysphagia     R13.19              PATIENT REPORT/COMPLAINT: see above    PRIOR LEVEL OF SWALLOW FUNCTION:    Past History of Dysphagia?:  none reported    Home diet: Regular consistency solids (IDDSI level 7) with  thin liquids (IDDSI level 0)      Current Diet Order:  No diet orders on file    PROCEDURE:  Consistencies Administered During the Evaluation   Liquids: thin liquid and nectar thick liquid   Solids:  pureed foods and solid foods      Method of Intake:   cup, straw, spoon  Self fed, Fed by clinician      Position:   Seated, upright    INSTRUMENTAL ASSESSMENT:    ORAL PREPARATION PHASE:    Within functional limits for age and dentition    ORAL PHASE:    Within functional limits for age and dentition- reduced bolus formation for solids with mild premature spillage     PHARYNGEAL PHASE:     ONSET TIME       Onset time of the pharyngeal swallow was adequate- noted premature spillage to pyriform inconsistently.         PHARYNGEAL RESIDUALS        Vallecula/Pharyngeal Wall           No significant residuals were noted in the vallecula      Pyriform Sinuses      No significant residuals were noted in the pyriform sinuses     LARYNGEAL PENETRATION   Laryngeal penetration was not present during this evaluation    ASPIRATION  Aspiration was not present during this evaluation    PENETRATION-ASPIRATION SCALE (PAS):  THIN 1 = Material does not enter the airway  MILDLY THICK 1 = Material does not enter the airway  MODERATELY THICK item not administered  PUREE 1 = Material does not enter the airway  HARD SOLID 1 = Material does not enter the airway       COMPENSATORY STRATEGIES    Compensatory strategies were not attempted      STRUCTURAL/FUNCTIONAL ANOMALIES   No structural/functional anomalies were noted    CERVICAL ESOPHAGEAL STAGE :     The cervical esophagus appeared adequate          ___________    Cognition:   Within functional limits for this exam    Oral Peripheral Examination   Adequate lingual/labial strength     Current Respiratory Status   room air     Parameters of Speech Production  Respiration:  Adequate for speech production  Quality:   Within functional limits  Intensity: Within functional limits    Pain: No pain reported. EDUCATION:   The Speech Language Pathologist (SLP) completed education regarding results of evaluation and that intervention is warranted at this time. Learner: Patient  Education: Reviewed results and recommendations of this evaluation, Reviewed diet and strategies, Reviewed recommendations for follow-up and Education Related to Potential Risks and Complications Due to Impairment/Illness/Injury  Evaluation of Education:  Verbalizes understanding    This plan may be re-evaluated and revised as warranted. Evaluation Time includes thorough review of current medical information, gathering information on past medical history/social history and prior level of function, completion of standardized testing/informal observation of tasks, assessment of data and education on plan of care and goals. [x]The admitting diagnosis and active problem list, have been reviewed prior to initiation of this evaluation. CPT Code: 33637  dysphagia study    INTERVENTION  CPT Code: 51466  dysphagia tx    Speech Pathologist (SLP) completed education with the patient/family regarding procedure of Modified Barium Swallow Study prior to exam and then type of swallowing impairment following completion of MBSS. Reviewed current solid/liquid consistency diet recommendations --   Regular consistency solids with  thin liquids (IDDSI level 0) and discussed compensatory strategies (n/a) to ensure safe PO intake.  Images from MBSS reviewed with patient/ family and education provided. Reviewed aspiration precautions. Encouraged patient and/or family to engage SLP in unstructured Q&A session relative to identified deficit areas; indicated understanding of all information provided via satisfactory verbal response.             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

## 2022-05-13 ENCOUNTER — HOSPITAL ENCOUNTER (OUTPATIENT)
Dept: GENERAL RADIOLOGY | Age: 77
Discharge: HOME | End: 2022-05-15
Payer: COMMERCIAL

## 2022-05-13 ENCOUNTER — HOSPITAL ENCOUNTER (EMERGENCY)
Age: 77
Discharge: HOME | End: 2022-05-13
Attending: EMERGENCY MEDICINE
Payer: COMMERCIAL

## 2022-05-13 VITALS
DIASTOLIC BLOOD PRESSURE: 66 MMHG | OXYGEN SATURATION: 97 % | TEMPERATURE: 98.7 F | HEART RATE: 59 BPM | SYSTOLIC BLOOD PRESSURE: 137 MMHG | RESPIRATION RATE: 16 BRPM

## 2022-05-13 DIAGNOSIS — R10.84 ABDOMINAL PAIN, GENERALIZED: ICD-10-CM

## 2022-05-13 DIAGNOSIS — K22.9 ESOPHAGUS DISORDER: Primary | ICD-10-CM

## 2022-05-13 DIAGNOSIS — R13.19 CONSTANT LOW-GRADE DYSPHAGIA: ICD-10-CM

## 2022-05-13 PROCEDURE — 74220 X-RAY XM ESOPHAGUS 1CNTRST: CPT

## 2022-05-13 PROCEDURE — 99283 EMERGENCY DEPT VISIT LOW MDM: CPT

## 2022-05-13 ASSESSMENT — ENCOUNTER SYMPTOMS
EYE PAIN: 0
VOMITING: 0
SORE THROAT: 0
DIARRHEA: 0
ABDOMINAL PAIN: 0
SINUS PAIN: 0
BACK PAIN: 0
NAUSEA: 0
SHORTNESS OF BREATH: 0

## 2022-05-13 NOTE — ED NOTES
Per providers verbal order, pt drank water and swallowed with no incident. Patient did not cough or gag after swallowing. Patient immediately began to speak after drinking and had no issues. Provider notified.      Devon Grant RN  05/13/22 [id number]

## 2022-05-13 NOTE — ED PROVIDER NOTES
Chief Complaint   Patient presents with    Other       70-year-old female past medical history of CAD, CVA with residual left-sided weakness and swallowing difficulties, diabetes, hypertension presenting after an abnormal esophagram study today. She had an esophagram done earlier and the results were called to [address] [place] [address] after she had gone back there, the results note that the contrast was unable to make it in the stomach and there was residual food left over from when the last time the patient ate which was yesterday morning. The patient does not have any acute complaints, she was able to drink the contrast this morning and swallow without difficulty, nothing is made it better or worse, not associated with neck pain, difficulty breathing, chest pain, nausea, vomiting. She does not feel as if there is anything stuck in her throat. Review of Systems   Constitutional: Negative for chills and fever. HENT: Negative for ear pain, sinus pain and sore throat. Eyes: Negative for pain. Respiratory: Negative for shortness of breath. Cardiovascular: Negative for chest pain. Gastrointestinal: Negative for abdominal pain, diarrhea, nausea and vomiting. Genitourinary: Negative for flank pain and pelvic pain. Musculoskeletal: Negative for back pain and neck pain. Skin: Negative for rash. Neurological: Negative for seizures and headaches. Hematological: Negative for adenopathy. All other systems reviewed and are negative. Physical Exam  Vitals and nursing note reviewed. Constitutional:       Appearance: She is well-developed. HENT:      Head: Normocephalic and atraumatic. Right Ear: External ear normal.      Left Ear: External ear normal.      Nose: Nose normal.      Mouth/Throat:      Mouth: Mucous membranes are moist.      Pharynx: Oropharynx is clear. Eyes:      Pupils: Pupils are equal, round, and reactive to light.    Cardiovascular:      Rate and Rhythm: Normal rate and regular rhythm. Heart sounds: Normal heart sounds. No murmur heard. Pulmonary:      Effort: Pulmonary effort is normal.      Breath sounds: Normal breath sounds. Abdominal:      General: Bowel sounds are normal. There is no distension. Palpations: Abdomen is soft. Tenderness: There is no abdominal tenderness. There is no guarding or rebound. Musculoskeletal:         General: No swelling. Cervical back: Normal range of motion and neck supple. Skin:     General: Skin is warm and dry. Neurological:      Mental Status: She is alert and oriented to person, place, and time. Procedures       MDM  Number of Diagnoses or Management Options  Esophagus disorder  Diagnosis management comments: 69-year-old female past medical history of CAD, CVA with residual left-sided weakness and swallowing difficulties, diabetes, hypertension presenting after an abnormal esophagram study today. She is hemodynamically stable arrival to emergency department and physical exam was entirely benign, patient was asymptomatic. Touch base with GI to verify what their plan had been, they noted that the patient needed an outpatient EGD but it was not an emergent process as she was still able to swallow. She did swallow the contrast earlier today and passed a p.o. challenge in the emergency department. With no acute issues, patient was discharged back to [facility]. Attempted to touch base with them but was unable to get through. Given return precautions. ED Course as of 05/13/22 1755   Fri May 13, 2022   1650 Spoke with Dr. Earl Zamora covering for Dr. Hannah Patten, based off of the readings from the esophagram he is not concerned about any acute process. He notes that the patient will need an EGD eventually but does not need to be an emergent procedure since she is asymptomatic and was able to swallow contrast earlier today.  [MM]   1708 Try to reach [place] to see if there is more to the story and cannot get through to anyone. Will p.o. challenge patient. [MM]      ED Course User Index  [MM] Michael Linares DO        ED Course as of 05/13/22 1755   Fri May 13, 2022   1650 Spoke with Dr. Wallace Becerra covering for Dr. Maciej Diaz, based off of the readings from the esophagram he is not concerned about any acute process. He notes that the patient will need an EGD eventually but does not need to be an emergent procedure since she is asymptomatic and was able to swallow contrast earlier today. [MM]   1708 Try to reach [place] to see if there is more to the story and cannot get through to anyone. Will p.o. challenge patient. 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 ([address] CAD (coronary artery disease), Cannot walk, Cerebrovascular disease, Decreased dorsalis pedis pulse, Diabetes mellitus ([address] Diverticulitis, dyslipidemia, Hemiparesis of left nondominant side as late effect of cerebrovascular disease ([address] Hypertension, Hypothyroidism, MI (myocardial infarction) ([address] Obesity, Osteoporosis, Squamous cell carcinoma of lower leg, left, Thyroid disease, TIA (transient ischemic attack), Type II or unspecified type diabetes mellitus without mention of complication, not stated as uncontrolled, and Ulcer of calf with fat layer exposed, left ([address] Past Surgical History:  has a past surgical history that includes Hysterectomy; hernia repair; Cholecystectomy; Dilatation, esophagus; Cardiac catheterization (6/06/12); vascular surgery; Tonsillectomy; Appendectomy; Diagnostic Cardiac Cath Lab Procedure; Cardiac surgery (6/15/12); Coronary artery bypass graft (6/15/12); and Cardiac valve replacement (June 2012).     Social History: reports that she quit smoking about 51 years ago. She smoked 0.50 packs per day. She has never used smokeless tobacco. She reports that she does not drink alcohol and does not use drugs. Family History: family history includes Heart Disease in her brother and father; High Blood Pressure in her brother and father. The patients home medications have been reviewed. Allergies: Phenergan [promethazine hcl]    -------------------------------------------------- RESULTS -------------------------------------------------  Labs:  No results found for this visit on 05/13/22. Radiology:  No orders to display       ------------------------- NURSING NOTES AND VITALS REVIEWED ---------------------------  Date / Time Roomed:  5/13/2022  4:05 PM  ED Bed Assignment:  17/17    The nursing notes within the ED encounter and vital signs as below have been reviewed. /66   Pulse 59   Temp 98.7 °F (37.1 °C) (Oral)   Resp 16   SpO2 97%   Oxygen Saturation Interpretation: Normal      ------------------------------------------ PROGRESS NOTES ------------------------------------------  I have spoken with the patient and discussed todays results, in addition to providing specific details for the plan of care and counseling regarding the diagnosis and prognosis. Their questions are answered at this time and they are agreeable with the plan. I discussed at length with them reasons for immediate return here for re evaluation. They will followup with primary care by calling their office tomorrow. --------------------------------- ADDITIONAL PROVIDER NOTES ---------------------------------  At this time the patient is without objective evidence of an acute process requiring hospitalization or inpatient management. They have remained hemodynamically stable throughout their entire ED visit and are stable for discharge with outpatient follow-up.      The plan has been discussed in detail and they are aware of the specific conditions for emergent return, as well as the importance of follow-up. New Prescriptions    No medications on file       Diagnosis:  1. Esophagus disorder        Disposition:  Patient's disposition: Discharge to nursing home  Patient's condition is stable.            Reji Pinon, DO  Resident  05/13/22 7518

## 2022-05-13 NOTE — ED NOTES
This RN called [facility] and set up transport for patient to return back to [facility].      Quiana Rojo RN  05/13/22 2511

## 2022-05-13 NOTE — ED TRIAGE NOTES
Per EMS, pt was seen here today for a barium swallow. The barium did not clear from the esophagus during the exam. Bruce White sent pt in for further evaluation.

## 2022-05-13 NOTE — ED NOTES
This RN called and gave nurse to nurse to Nurse Daniela To from [address] home.      Reva Correia RN  05/13/22 1800

## 2022-06-13 ENCOUNTER — HOSPITAL ENCOUNTER (OUTPATIENT)
Dept: GENERAL RADIOLOGY | Age: 77
Discharge: HOME | End: 2022-06-15
Payer: COMMERCIAL

## 2022-06-13 DIAGNOSIS — R13.19 CONSTANT LOW-GRADE DYSPHAGIA: ICD-10-CM

## 2022-06-13 DIAGNOSIS — R10.84 ABDOMINAL PAIN, GENERALIZED: ICD-10-CM

## 2022-06-13 PROCEDURE — 2500000003 HC RX 250 WO HCPCS: Performed by: RADIOLOGY

## 2022-06-13 PROCEDURE — 74240 X-RAY XM UPR GI TRC 1CNTRST: CPT

## 2022-06-13 RX ADMIN — BARIUM SULFATE 176 G: 960 POWDER, FOR SUSPENSION ORAL at 09:09

## 2022-06-23 ENCOUNTER — HOSPITAL ENCOUNTER (INPATIENT)
Age: 77
LOS: 5 days | Discharge: HOME | DRG: 392 | End: 2022-06-30
Attending: EMERGENCY MEDICINE | Admitting: INTERNAL MEDICINE
Payer: COMMERCIAL

## 2022-06-23 DIAGNOSIS — R13.10 DYSPHAGIA, UNSPECIFIED TYPE: Primary | ICD-10-CM

## 2022-06-23 LAB
ALBUMIN SERPL-MCNC: 2.7 G/DL (ref 3.5–5.2)
ALP BLD-CCNC: 124 U/L (ref 35–104)
ALT SERPL-CCNC: 13 U/L (ref 0–32)
ANION GAP SERPL CALCULATED.3IONS-SCNC: 9 MMOL/L (ref 7–16)
AST SERPL-CCNC: 20 U/L (ref 0–31)
BASOPHILS ABSOLUTE: 0.06 E9/L (ref 0–0.2)
BASOPHILS RELATIVE PERCENT: 0.7 % (ref 0–2)
BILIRUB SERPL-MCNC: 0.5 MG/DL (ref 0–1.2)
BUN BLDV-MCNC: 11 MG/DL (ref 6–23)
CALCIUM SERPL-MCNC: 8.5 MG/DL (ref 8.6–10.2)
CHLORIDE BLD-SCNC: 102 MMOL/L (ref 98–107)
CO2: 25 MMOL/L (ref 22–29)
CREAT SERPL-MCNC: 0.5 MG/DL (ref 0.5–1)
EOSINOPHILS ABSOLUTE: 0.04 E9/L (ref 0.05–0.5)
EOSINOPHILS RELATIVE PERCENT: 0.5 % (ref 0–6)
GFR AFRICAN AMERICAN: >60
GFR NON-AFRICAN AMERICAN: >60 ML/MIN/1.73
GLUCOSE BLD-MCNC: 119 MG/DL (ref 74–99)
HCT VFR BLD CALC: 29.7 % (ref 34–48)
HEMOGLOBIN: 9.8 G/DL (ref 11.5–15.5)
IMMATURE GRANULOCYTES #: 0.03 E9/L
IMMATURE GRANULOCYTES %: 0.4 % (ref 0–5)
LYMPHOCYTES ABSOLUTE: 2.48 E9/L (ref 1.5–4)
LYMPHOCYTES RELATIVE PERCENT: 30.6 % (ref 20–42)
MCH RBC QN AUTO: 31.4 PG (ref 26–35)
MCHC RBC AUTO-ENTMCNC: 33 % (ref 32–34.5)
MCV RBC AUTO: 95.2 FL (ref 80–99.9)
MONOCYTES ABSOLUTE: 0.68 E9/L (ref 0.1–0.95)
MONOCYTES RELATIVE PERCENT: 8.4 % (ref 2–12)
NEUTROPHILS ABSOLUTE: 4.82 E9/L (ref 1.8–7.3)
NEUTROPHILS RELATIVE PERCENT: 59.4 % (ref 43–80)
PDW BLD-RTO: 13.2 FL (ref 11.5–15)
PLATELET # BLD: 351 E9/L (ref 130–450)
PMV BLD AUTO: 10.3 FL (ref 7–12)
POTASSIUM REFLEX MAGNESIUM: 3.9 MMOL/L (ref 3.5–5)
RBC # BLD: 3.12 E12/L (ref 3.5–5.5)
SODIUM BLD-SCNC: 136 MMOL/L (ref 132–146)
TOTAL PROTEIN: 6 G/DL (ref 6.4–8.3)
WBC # BLD: 8.1 E9/L (ref 4.5–11.5)

## 2022-06-23 PROCEDURE — 99285 EMERGENCY DEPT VISIT HI MDM: CPT

## 2022-06-23 PROCEDURE — 85025 COMPLETE CBC W/AUTO DIFF WBC: CPT

## 2022-06-23 PROCEDURE — 80053 COMPREHEN METABOLIC PANEL: CPT

## 2022-06-23 NOTE — Clinical Note
Discharge Plan[de-identified] Other/[facility])   Telemetry/Cardiac Monitoring Required?: No

## 2022-06-24 PROBLEM — F03.90 DEMENTIA (HCC): Status: ACTIVE | Noted: 2022-06-24

## 2022-06-24 PROBLEM — H53.149 PHOTOPHOBIA: Status: ACTIVE | Noted: 2022-06-24

## 2022-06-24 PROBLEM — E03.9 HYPOTHYROIDISM: Status: ACTIVE | Noted: 2020-03-09

## 2022-06-24 PROBLEM — R13.10 DYSPHAGIA: Status: ACTIVE | Noted: 2022-06-24

## 2022-06-24 PROBLEM — F32.9 MAJOR DEPRESSIVE DISORDER: Status: ACTIVE | Noted: 2020-03-09

## 2022-06-24 PROBLEM — K21.9 GASTROESOPHAGEAL REFLUX DISEASE: Status: ACTIVE | Noted: 2020-03-09

## 2022-06-24 PROBLEM — I21.9 MYOCARDIAL INFARCTION (HCC): Status: ACTIVE | Noted: 2022-06-24

## 2022-06-24 LAB
ALBUMIN SERPL-MCNC: 2.7 G/DL (ref 3.5–5.2)
ALP BLD-CCNC: 126 U/L (ref 35–104)
ALT SERPL-CCNC: 13 U/L (ref 0–32)
ANION GAP SERPL CALCULATED.3IONS-SCNC: 12 MMOL/L (ref 7–16)
AST SERPL-CCNC: 20 U/L (ref 0–31)
BILIRUB SERPL-MCNC: 0.7 MG/DL (ref 0–1.2)
BUN BLDV-MCNC: 10 MG/DL (ref 6–23)
CALCIUM SERPL-MCNC: 8.8 MG/DL (ref 8.6–10.2)
CHLORIDE BLD-SCNC: 102 MMOL/L (ref 98–107)
CO2: 22 MMOL/L (ref 22–29)
CREAT SERPL-MCNC: 0.5 MG/DL (ref 0.5–1)
GFR AFRICAN AMERICAN: >60
GFR NON-AFRICAN AMERICAN: >60 ML/MIN/1.73
GLUCOSE BLD-MCNC: 110 MG/DL (ref 74–99)
METER GLUCOSE: 108 MG/DL (ref 74–99)
METER GLUCOSE: 120 MG/DL (ref 74–99)
METER GLUCOSE: 137 MG/DL (ref 74–99)
METER GLUCOSE: 141 MG/DL (ref 74–99)
METER GLUCOSE: 146 MG/DL (ref 74–99)
POTASSIUM REFLEX MAGNESIUM: 3.6 MMOL/L (ref 3.5–5)
SODIUM BLD-SCNC: 136 MMOL/L (ref 132–146)
TOTAL PROTEIN: 6.1 G/DL (ref 6.4–8.3)

## 2022-06-24 PROCEDURE — 92610 EVALUATE SWALLOWING FUNCTION: CPT | Performed by: SPEECH-LANGUAGE PATHOLOGIST

## 2022-06-24 PROCEDURE — 2580000003 HC RX 258: Performed by: NURSE PRACTITIONER

## 2022-06-24 PROCEDURE — 92526 ORAL FUNCTION THERAPY: CPT | Performed by: SPEECH-LANGUAGE PATHOLOGIST

## 2022-06-24 PROCEDURE — 96375 TX/PRO/DX INJ NEW DRUG ADDON: CPT

## 2022-06-24 PROCEDURE — G0378 HOSPITAL OBSERVATION PER HR: HCPCS

## 2022-06-24 PROCEDURE — 99220 PR INITIAL OBSERVATION CARE/DAY 70 MINUTES: CPT | Performed by: INTERNAL MEDICINE

## 2022-06-24 PROCEDURE — 2500000003 HC RX 250 WO HCPCS: Performed by: NURSE PRACTITIONER

## 2022-06-24 PROCEDURE — APPSS45 APP SPLIT SHARED TIME 31-45 MINUTES: Performed by: NURSE PRACTITIONER

## 2022-06-24 PROCEDURE — 36415 COLL VENOUS BLD VENIPUNCTURE: CPT

## 2022-06-24 PROCEDURE — A4216 STERILE WATER/SALINE, 10 ML: HCPCS | Performed by: NURSE PRACTITIONER

## 2022-06-24 PROCEDURE — 82962 GLUCOSE BLOOD TEST: CPT

## 2022-06-24 PROCEDURE — C9113 INJ PANTOPRAZOLE SODIUM, VIA: HCPCS | Performed by: NURSE PRACTITIONER

## 2022-06-24 PROCEDURE — 80053 COMPREHEN METABOLIC PANEL: CPT

## 2022-06-24 PROCEDURE — 96374 THER/PROPH/DIAG INJ IV PUSH: CPT

## 2022-06-24 PROCEDURE — 6360000002 HC RX W HCPCS: Performed by: NURSE PRACTITIONER

## 2022-06-24 RX ORDER — INSULIN LISPRO 100 [IU]/ML
0-6 INJECTION, SOLUTION INTRAVENOUS; SUBCUTANEOUS
Status: DISCONTINUED | OUTPATIENT
Start: 2022-06-24 | End: 2022-06-30 | Stop reason: HOSPADM

## 2022-06-24 RX ORDER — INSULIN LISPRO 100 [IU]/ML
0-3 INJECTION, SOLUTION INTRAVENOUS; SUBCUTANEOUS NIGHTLY
Status: DISCONTINUED | OUTPATIENT
Start: 2022-06-24 | End: 2022-06-30 | Stop reason: HOSPADM

## 2022-06-24 RX ORDER — SODIUM CHLORIDE 0.9 % (FLUSH) 0.9 %
5-40 SYRINGE (ML) INJECTION PRN
Status: DISCONTINUED | OUTPATIENT
Start: 2022-06-24 | End: 2022-06-30 | Stop reason: HOSPADM

## 2022-06-24 RX ORDER — ACETAMINOPHEN 650 MG/1
650 SUPPOSITORY RECTAL EVERY 6 HOURS PRN
Status: DISCONTINUED | OUTPATIENT
Start: 2022-06-24 | End: 2022-06-30 | Stop reason: HOSPADM

## 2022-06-24 RX ORDER — METOPROLOL TARTRATE 5 MG/5ML
5 INJECTION INTRAVENOUS EVERY 6 HOURS PRN
Status: DISCONTINUED | OUTPATIENT
Start: 2022-06-24 | End: 2022-06-30 | Stop reason: HOSPADM

## 2022-06-24 RX ORDER — ONDANSETRON 4 MG/1
4 TABLET, ORALLY DISINTEGRATING ORAL EVERY 8 HOURS PRN
Status: DISCONTINUED | OUTPATIENT
Start: 2022-06-24 | End: 2022-06-30 | Stop reason: HOSPADM

## 2022-06-24 RX ORDER — SODIUM CHLORIDE, SODIUM LACTATE, POTASSIUM CHLORIDE, CALCIUM CHLORIDE 600; 310; 30; 20 MG/100ML; MG/100ML; MG/100ML; MG/100ML
INJECTION, SOLUTION INTRAVENOUS CONTINUOUS
Status: DISCONTINUED | OUTPATIENT
Start: 2022-06-24 | End: 2022-06-30 | Stop reason: HOSPADM

## 2022-06-24 RX ORDER — SODIUM CHLORIDE 0.9 % (FLUSH) 0.9 %
5-40 SYRINGE (ML) INJECTION EVERY 12 HOURS SCHEDULED
Status: DISCONTINUED | OUTPATIENT
Start: 2022-06-24 | End: 2022-06-30 | Stop reason: HOSPADM

## 2022-06-24 RX ORDER — HYDRALAZINE HYDROCHLORIDE 20 MG/ML
10 INJECTION INTRAMUSCULAR; INTRAVENOUS EVERY 6 HOURS PRN
Status: DISCONTINUED | OUTPATIENT
Start: 2022-06-24 | End: 2022-06-30 | Stop reason: HOSPADM

## 2022-06-24 RX ORDER — ONDANSETRON 2 MG/ML
4 INJECTION INTRAMUSCULAR; INTRAVENOUS EVERY 6 HOURS PRN
Status: DISCONTINUED | OUTPATIENT
Start: 2022-06-24 | End: 2022-06-30 | Stop reason: HOSPADM

## 2022-06-24 RX ORDER — POLYETHYLENE GLYCOL 3350 17 G/17G
17 POWDER, FOR SOLUTION ORAL DAILY PRN
Status: DISCONTINUED | OUTPATIENT
Start: 2022-06-24 | End: 2022-06-30 | Stop reason: HOSPADM

## 2022-06-24 RX ORDER — SODIUM CHLORIDE 9 MG/ML
INJECTION, SOLUTION INTRAVENOUS PRN
Status: DISCONTINUED | OUTPATIENT
Start: 2022-06-24 | End: 2022-06-30 | Stop reason: HOSPADM

## 2022-06-24 RX ORDER — ACETAMINOPHEN 325 MG/1
650 TABLET ORAL EVERY 6 HOURS PRN
Status: DISCONTINUED | OUTPATIENT
Start: 2022-06-24 | End: 2022-06-25

## 2022-06-24 RX ORDER — LEVOTHYROXINE SODIUM ANHYDROUS 100 UG/5ML
37.5 INJECTION, POWDER, LYOPHILIZED, FOR SOLUTION INTRAVENOUS DAILY
Status: DISCONTINUED | OUTPATIENT
Start: 2022-06-29 | End: 2022-06-28

## 2022-06-24 RX ORDER — ENOXAPARIN SODIUM 100 MG/ML
40 INJECTION SUBCUTANEOUS DAILY
Status: DISCONTINUED | OUTPATIENT
Start: 2022-06-24 | End: 2022-06-30 | Stop reason: HOSPADM

## 2022-06-24 RX ORDER — DEXTROSE MONOHYDRATE 50 MG/ML
100 INJECTION, SOLUTION INTRAVENOUS PRN
Status: DISCONTINUED | OUTPATIENT
Start: 2022-06-24 | End: 2022-06-30 | Stop reason: HOSPADM

## 2022-06-24 RX ADMIN — Medication 10 ML: at 20:58

## 2022-06-24 RX ADMIN — METOPROLOL TARTRATE 5 MG: 1 INJECTION, SOLUTION INTRAVENOUS at 20:58

## 2022-06-24 RX ADMIN — HYDRALAZINE HYDROCHLORIDE 10 MG: 20 INJECTION INTRAMUSCULAR; INTRAVENOUS at 08:34

## 2022-06-24 RX ADMIN — ENOXAPARIN SODIUM 40 MG: 100 INJECTION SUBCUTANEOUS at 08:34

## 2022-06-24 RX ADMIN — SODIUM CHLORIDE, PRESERVATIVE FREE 40 MG: 5 INJECTION INTRAVENOUS at 08:34

## 2022-06-24 RX ADMIN — SODIUM CHLORIDE, POTASSIUM CHLORIDE, SODIUM LACTATE AND CALCIUM CHLORIDE: 600; 310; 30; 20 INJECTION, SOLUTION INTRAVENOUS at 03:33

## 2022-06-24 ASSESSMENT — ENCOUNTER SYMPTOMS
ABDOMINAL PAIN: 0
BACK PAIN: 0
SHORTNESS OF BREATH: 0
COUGH: 0

## 2022-06-24 NOTE — CONSULTS
Ana Campos M.D. [facility]  Dr. Lisseth Kaufman M.D.,  Dr. Onur Del Valle M.D.,  Dr. Cedric Phelan D.O.,  Dr. Abran Goldne D.O. ,  Dr. Janeth Kimble M.D.,  Dr. Drake Dandy, D.O. Tamanna Mena  65 y.o.  female      Re: Dysphagia  Requesting YOLIS Matos/hospitalist service  Date:3:11 PM 6/24/2022          HPI: 69-year-old female patient from a nursing home with chief complaint of dysphagia. Patient has extensive past medical history of aortic stenosis, CAD, CABG x2, CVA, diabetes mellitus, hyperlipidemia, hypertension and hypothyroidism. Patient apparently has been experiencing difficulty swallowing. She underwent upper GI series on 13 June revealing abnormal esophageal motility with lower esophagus sphincter with intermittent opening and beaklike appearance consistent with achalasia. Patient herself is denying any abdominal pain. She appears however somewhat confused. Apparently previously she has been complaining of food being stuck in her throat/chest but not anymore. I have discussed patient with her daughter over the phone Mickaldo Blend [phone]) -according to her patient is confused.       Information sources:   -Patient  -family  -medical record  -health care team    PMHx:  Past Medical History:   Diagnosis Date    Abnormal stress ECG     Aortic stenosis     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 ([address] 10/19/2021    CAD (coronary artery disease)     Cannot walk 10/19/2021    Cerebrovascular disease     Decreased dorsalis pedis pulse 10/19/2021    Diabetes mellitus ([address]     Diverticulitis     dyslipidemia     Hemiparesis of left nondominant side as late effect of cerebrovascular disease ([address] 10/19/2021    Hypertension     Hypothyroidism     MI (myocardial infarction) ([address]     questionable 1986    Obesity     Osteoporosis     Squamous cell carcinoma of lower leg, left 11/2/2021    Thyroid disease  TIA (transient ischemic attack)     several post op TIAs    Type II or unspecified type diabetes mellitus without mention of complication, not stated as uncontrolled     Ulcer of calf with fat layer exposed, left ([address] 10/19/2021       PSHx:  Past Surgical History:   Procedure Laterality Date    APPENDECTOMY      CARDIAC CATHETERIZATION  6/06/12    Heart Lab, EF 65%, two vessel CAD, total prox RCA wtih left to right collaterals, prox OM branch 80% and mild CX 40%, severe aortic stenosis. Surgery consult advised for CABG and AVR. [facility]  6/15/12    Epiaortic ultrasound scan. ILSET. CABG x2 with reverse SVG to PDA, reverse SVG to second OMB. Aortic valve replacement with size 21 Mosaic ultra porcine heart valve.   Mitral valve repair with insertion of size 28 future annuloplasty band, [address] VALVE REPLACEMENT  June 2012    AVR size 21 mosaic ultra porcine valve     CHOLECYSTECTOMY      CORONARY ARTERY BYPASS GRAFT  6/15/12    DIAGNOSTIC CARDIAC CATH LAB PROCEDURE      DILATATION, ESOPHAGUS      HERNIA REPAIR      HYSTERECTOMY      age 43    TONSILLECTOMY      VASCULAR SURGERY         Meds:  Current Facility-Administered Medications   Medication Dose Route Frequency Provider Last Rate Last Admin    sodium chloride flush 0.9 % injection 5-40 mL  5-40 mL IntraVENous 2 times per day YOLIS Quiroga CNP        sodium chloride flush 0.9 % injection 5-40 mL  5-40 mL IntraVENous PRN YOLIS Quiroga CNP        0.9 % sodium chloride infusion   IntraVENous PRN YOLIS Quiroga CNP        enoxaparin (LOVENOX) injection 40 mg  40 mg SubCUTAneous Daily YOLIS Quiroga CNP   40 mg at 06/24/22 0834    ondansetron (ZOFRAN-ODT) disintegrating tablet 4 mg  4 mg Oral Q8H PRN YOLIS Quiroga CNP        Or    ondansetron (ZOFRAN) injection 4 mg  4 mg IntraVENous Q6H PRN YOLIS Quiroga CNP        polyethylene glycol (GLYCOLAX) packet 17 g  17 g Oral Daily PRN YOLIS Young CNP        acetaminophen (TYLENOL) tablet 650 mg  650 mg Oral Q6H PRN YOLIS Young CNP        Or    acetaminophen (TYLENOL) suppository 650 mg  650 mg Rectal Q6H PRN YOLIS Young - CNP        metoprolol (LOPRESSOR) injection 5 mg  5 mg IntraVENous Q6H PRN YOLIS Yougn - CNP        hydrALAZINE (APRESOLINE) injection 10 mg  10 mg IntraVENous Q6H PRN YOLSI Young - CNP   10 mg at 06/24/22 0834    [START ON 6/29/2022] levothyroxine (SYNTHROID) injection 37.5 mcg  37.5 mcg IntraVENous Daily YOLIS Young CNP        insulin lispro (HUMALOG) injection vial 0-6 Units  0-6 Units SubCUTAneous TID WC YOLIS Young CNP        insulin lispro (HUMALOG) injection vial 0-3 Units  0-3 Units SubCUTAneous Nightly YOLIS Young CNP        glucose chewable tablet 16 g  4 tablet Oral PRN YOLIS Young - CNP        dextrose bolus 10% 125 mL  125 mL IntraVENous PRN YOLIS Young CNP        Or    dextrose bolus 10% 250 mL  250 mL IntraVENous PRN YOLIS Young - CNP        glucagon (rDNA) injection 1 mg  1 mg IntraMUSCular PRN YOLIS Young CNP        dextrose 5 % solution  100 mL/hr IntraVENous PRN YOLIS Young - CNP        lactated ringers infusion   IntraVENous Continuous Joy Gonzáles MD 75 mL/hr at 06/24/22 0839 Rate Change at 06/24/22 0839    pantoprazole (PROTONIX) 40 mg in sodium chloride (PF) 10 mL injection  40 mg IntraVENous Daily YOLIS Young CNP   40 mg at 06/24/22 1585       SocHx:  Social History     Socioeconomic History    Marital status:       Spouse name: Not on file    Number of children: Not on file    Years of education: Not on file    Highest education level: Not on file   Occupational History    Not on file   Tobacco Use    Smoking status: Former Smoker     Packs/day: 0.50     Quit date: 6/6/1970 Pulse 79   Temp 98.1 °F (36.7 °C) (Oral)   Resp 16   Ht 5' (1.524 m)   Wt 112 lb 12.8 oz (51.2 kg)   SpO2 99%   BMI 22.03 kg/m²     Gen.: ED/elderly  female  Head: Atraumatic/normocephalic  Eyes:Anicteric sclera/no conjunctival erythema  ENT: Some scratching/ulcerations on the nose. Moist oral mucosa  Neck: Supple with trachea midline  Chest: CTA B. Midline surgical scar consistent with sternotomy  Cor: Regular rhythm and rate  Abd.: Soft and obese.   Nontender  Extr.:  No significant peripheral edema  Muscles: Decreased muscle tone and bulk throughout  Skin: Warm and dry      DATA:     Lab Results   Component Value Date    WBC 8.1 06/23/2022    RBC 3.12 06/23/2022    HGB 9.8 06/23/2022    HCT 29.7 06/23/2022    MCV 95.2 06/23/2022    MCH 31.4 06/23/2022    MCHC 33.0 06/23/2022    RDW 13.2 06/23/2022     06/23/2022    MPV 10.3 06/23/2022     Lab Results   Component Value Date     06/24/2022    K 3.6 06/24/2022     06/24/2022    CO2 22 06/24/2022    BUN 10 06/24/2022    CREATININE 0.5 06/24/2022    CALCIUM 8.8 06/24/2022    PROT 6.1 06/24/2022    LABALBU 2.7 06/24/2022    LABALBU 4.4 06/04/2012    BILITOT 0.7 06/24/2022    ALKPHOS 126 06/24/2022    AST 20 06/24/2022    ALT 13 06/24/2022     No results found for: LIPASE  No results found for: AMYLASE      ASSESSMENT/PLAN:  Patient Active Problem List   Diagnosis    CAD (coronary artery disease)    S/P AVR (aortic valve replacement)    S/P MVR (mitral valve repair)    HTN (hypertension)    Dyslipidemia    Diabetes [facility]    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 Dysphagia  -Abnormal UGI 6/13  -Plan for further evaluation and treatment with upper endoscopy with Botox injection -plan for procedure over the weekend versus Monday/Tuesday of coming week  -Clear liquid diet in the meanwhile    2. Anemia  -Acute on chronic  -No evidence of overt bleed  -Obtain iron studies and FOBT  -EGD as above  -Outpatient colonoscopy -TBD    Above  has been discussed with patient's daughter over the phone and all questions answered to her satisfaction. She verbalized understanding and agreement with the plan as delineated. Thank you for the opportunity to see this patient in consultation    Francia Kimbrough MD  6/24/2022  3:11 PM    NOTE:  This report was transcribed using voice recognition software. Every effort was made to ensure accuracy; however, inadvertent computerized transcription errors may be present.

## 2022-06-24 NOTE — ED PROVIDER NOTES
This is a 50-year-old female with a complex past medical history including CVA diabetes and aortic stenosis resents to the ED for evaluation of trouble swallowing. Patient has been followed by her GI team apparently has been aspirating quite regularly. Patient apparently was at risk for aspiration was seen by her GI team who quested the patient be admitted for an EGD tomorrow. Patient has no chest pain nausea or abdominal pain currently. She has no cough or shortness of breath. No reported fevers or chills. No other reported mitigating or exacerbating factors. The history is provided by the patient. No  was used. Review of Systems   Constitutional: Negative for fever. HENT: Negative for congestion. Eyes: Negative for visual disturbance. Respiratory: Negative for cough and shortness of breath. Cardiovascular: Negative for chest pain. Gastrointestinal: Negative for abdominal pain. Endocrine: Negative for polyuria. Genitourinary: Negative for dysuria. Musculoskeletal: Negative for back pain. Skin: Negative for rash. Allergic/Immunologic: Negative for immunocompromised state. Neurological: Negative for headaches. Hematological: Does not bruise/bleed easily. Psychiatric/Behavioral: Negative for confusion. Physical Exam  Vitals and nursing note reviewed. Constitutional:       General: She is not in acute distress. Appearance: She is well-developed. HENT:      Head: Normocephalic and atraumatic. Neck:      Vascular: No JVD. Cardiovascular:      Rate and Rhythm: Normal rate and regular rhythm. Pulmonary:      Effort: Pulmonary effort is normal.   Abdominal:      General: There is no distension. Palpations: Abdomen is soft. Musculoskeletal:      Cervical back: Normal range of motion. Skin:     General: Skin is warm and dry. Neurological:      Mental Status: She is alert and oriented to person, place, and time.       Cranial Nerves: No cranial nerve deficit. Psychiatric:         Behavior: Behavior normal.          Procedures     MDM  Number of Diagnoses or Management Options  Dysphagia, unspecified type  Diagnosis management comments: Patient was brought in from a care facility as she has been having apparent issues with aspiration she is followed with a GI team did evaluate the patient request that she be admitted for an EGD tomorrow. Patient was nontoxic no distress was at her baseline daughter was at bedside we discussed the plan. Blood work showed no signs of an infectious process leukocytosis no evidence of DKA focal medicine team who did agreed admit the patient for further evaluation                  --------------------------------------------- PAST HISTORY ---------------------------------------------  Past Medical History:  has a past medical history of Abnormal stress ECG, Aortic stenosis, 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 ([address] CAD (coronary artery disease), Cannot walk, Cerebrovascular disease, Decreased dorsalis pedis pulse, Diabetes mellitus ([address] Diverticulitis, dyslipidemia, Hemiparesis of left nondominant side as late effect of cerebrovascular disease ([address] Hypertension, Hypothyroidism, MI (myocardial infarction) ([address] Obesity, Osteoporosis, Squamous cell carcinoma of lower leg, left, Thyroid disease, TIA (transient ischemic attack), Type II or unspecified type diabetes mellitus without mention of complication, not stated as uncontrolled, and Ulcer of calf with fat layer exposed, left ([address] Past Surgical History:  has a past surgical history that includes Hysterectomy; hernia repair; Cholecystectomy; Dilatation, esophagus; Cardiac catheterization (6/06/12); vascular surgery; Tonsillectomy; Appendectomy; Diagnostic Cardiac Cath Lab Procedure; Cardiac surgery (6/15/12); Coronary artery bypass graft (6/15/12); and Cardiac valve replacement (June 2012).     Social History: reports that she quit smoking about 52 years ago. She smoked 0.50 packs per day. She has never used smokeless tobacco. She reports that she does not drink alcohol and does not use drugs. Family History: family history includes Heart Disease in her brother and father; High Blood Pressure in her brother and father. The patients home medications have been reviewed.     Allergies: Phenergan [promethazine hcl]    -------------------------------------------------- RESULTS -------------------------------------------------    LABS:  Results for orders placed or performed during the hospital encounter of 06/23/22   Comprehensive Metabolic Panel w/ Reflex to MG   Result Value Ref Range    Sodium 136 132 - 146 mmol/L    Potassium reflex Magnesium 3.9 3.5 - 5.0 mmol/L    Chloride 102 98 - 107 mmol/L    CO2 25 22 - 29 mmol/L    Anion Gap 9 7 - 16 mmol/L    Glucose 119 (H) 74 - 99 mg/dL    BUN 11 6 - 23 mg/dL    CREATININE 0.5 0.5 - 1.0 mg/dL    GFR Non-African American >60 >=60 mL/min/1.73    GFR African American >60     Calcium 8.5 (L) 8.6 - 10.2 mg/dL    Total Protein 6.0 (L) 6.4 - 8.3 g/dL    Albumin 2.7 (L) 3.5 - 5.2 g/dL    Total Bilirubin 0.5 0.0 - 1.2 mg/dL    Alkaline Phosphatase 124 (H) 35 - 104 U/L    ALT 13 0 - 32 U/L    AST 20 0 - 31 U/L   CBC with Auto Differential   Result Value Ref Range    WBC 8.1 4.5 - 11.5 E9/L    RBC 3.12 (L) 3.50 - 5.50 E12/L    Hemoglobin 9.8 (L) 11.5 - 15.5 g/dL    Hematocrit 29.7 (L) 34.0 - 48.0 %    MCV 95.2 80.0 - 99.9 fL    MCH 31.4 26.0 - 35.0 pg    MCHC 33.0 32.0 - 34.5 %    RDW 13.2 11.5 - 15.0 fL    Platelets 639 674 - 985 E9/L    MPV 10.3 7.0 - 12.0 fL    Neutrophils % 59.4 43.0 - 80.0 %    Immature Granulocytes % 0.4 0.0 - 5.0 %    Lymphocytes % 30.6 20.0 - 42.0 %    Monocytes % 8.4 2.0 - 12.0 %    Eosinophils % 0.5 0.0 - 6.0 %    Basophils % 0.7 0.0 - 2.0 %    Neutrophils Absolute 4.82 1.80 - 7.30 E9/L    Immature Granulocytes # 0.03 E9/L    Lymphocytes Absolute 2.48 1.50 Disposition:  Patient's disposition: Admit to med/surg floor  Patient's condition is stable.         Tay Rubin DO  06/24/22 5337

## 2022-06-24 NOTE — PROGRESS NOTES
[facility] Progress Note    --------------------------------------------------------------------------------------  Assessment / Plan  · Dysphagia  · IDDM  · Hx CAD/MI, anemia, HPL, HTN, hypothyroid    Passed bedside swallow here though prev esophagram / upper GI series demonstrated some abnormalities. Awaiting GI input but otherwise vitals and labs are stable. Please see orders for further plan of care.  Code status  Full   DVT prophylaxis Lovenox   Disposition  [facility] when ready - pt is long-term bed hold  --------------------------------------------------------------------------------------    Admission Date  6/23/2022  9:37 PM  Chief Complaint Dysphagia    Subjective  History of Present Illness  77F PMH dysphagia, IDDM, CAD/MI, anemia, HPL, HTN, hypothyroid presented from ED per GI for admission / EGD w Botox / possible PEG.   Passed bedside swallow eval this AM.      Review of Systems and Physical Exam already performed this calendar date by admitting physician    Electronically signed by Alice Garcia DO on 6/24/2022 at 9:07 AM

## 2022-06-24 NOTE — ED TRIAGE NOTES
Pt sent from [facility] for dysphasia. Attached to pts nursing home papers is a script from [facility] dated 06/23/22, stating \"Patient to the emergency room STAT for admission, EGD with botox, possible PEG insertion. Dx: High risk of aspiration. \"

## 2022-06-24 NOTE — CARE COORDINATION
Social Work/Discharge Planning:  Met with patient and attempted assessment, but patient unable to indicate name of facility she admitted. Called patient daughter Jagdeep Cosme (ph: [phone]) and completed assessment. Explained Social Work role. Rebeca plans for her mother to return to [place] at discharge. Called liaison Osiris with [place] and confirmed patient is a LTC bed hold. No pre-cert and no covid needed. Electronic N-17 in Epic and ambulance form in soft chart. Will continue to follow and assist with discharge planning.  Electronically signed by SHAYY Saba on 6/24/2022 at 12:05 PM

## 2022-06-24 NOTE — DISCHARGE INSTR - COC
Continuity of Care Form    Patient Name: Shahbaz Enamorado   :  1945  MRN:  [id number]    Admit date:  2022  Discharge date:  ***    Code Status Order: Full Code   Advance Directives:      Admitting Physician:  Lachelle Lynn DO  PCP: Ronal Chandler MD    Discharging Nurse: [facility] Unit/Room#: 4357/3550-X  Discharging Unit Phone Number: ***    Emergency Contact:   Extended Emergency Contact Information  Primary Emergency Contact: [facility]  Address: [address]           [place], [place] [address]  Home Phone: [phone]  Relation: Child  Secondary Emergency Contact: Mariela [facility]  Address: [address], [address]  Home Phone: [phone]  Mobile Phone: [phone]  Relation: Child    Past Surgical History:  Past Surgical History:   Procedure Laterality Date    APPENDECTOMY      CARDIAC CATHETERIZATION  12    Heart Lab, EF 65%, two vessel CAD, total prox RCA wtih left to right collaterals, prox OM branch 80% and mild CX 40%, severe aortic stenosis. Surgery consult advised for CABG and AVR. CARDIAC SURGERY  6/15/12    Epiaortic ultrasound scan. LISET. CABG x2 with reverse SVG to PDA, reverse SVG to second OMB. Aortic valve replacement with size 21 Mosaic ultra porcine heart valve. Mitral valve repair with insertion of size 28 future annuloplasty band, [facility] VALVE REPLACEMENT  2012    AVR size 21 mosaic ultra porcine valve     CHOLECYSTECTOMY      CORONARY ARTERY BYPASS GRAFT  6/15/12    DIAGNOSTIC CARDIAC CATH LAB PROCEDURE      DILATATION, ESOPHAGUS      HERNIA REPAIR      HYSTERECTOMY      age 43    TONSILLECTOMY      VASCULAR SURGERY         Immunization History: There is no immunization history on file for this patient.     Active Problems:  Patient Active Problem List   Diagnosis Code    CAD (coronary artery disease) I25.10    S/P AVR (aortic valve replacement) Z95.2    S/P MVR (mitral valve repair) Z98.890    HTN (hypertension) I10    Dyslipidemia E78.5    Diabetes mellitus ([facility] [address] E11.9    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 ([facility]) I70.249    Ulcer of calf with fat layer exposed, left ([facility]) L97.222    Decreased dorsalis pedis pulse R09.89    Hemiparesis of left nondominant side as late effect of cerebrovascular disease ([facility]) I69.954    Cannot walk R26.2    Squamous cell carcinoma of lower leg, left C44.729    Dysphagia R13.10    Dementia ([facility]) F03.90    Hypothyroidism E03.9    Gastroesophageal reflux disease K21.9    Myocardial infarction ([facility]) I21.9    Major depressive disorder F32.9    Photophobia H53.149       Isolation/Infection:   Isolation            No Isolation          Patient Infection Status       None to display            Nurse Assessment:  Last Vital Signs: /60   Pulse 79   Temp 98.1 °F (36.7 °C) (Oral)   Resp 16   Ht 5' (1.524 m)   Wt 112 lb 12.8 oz (51.2 kg)   SpO2 99%   BMI 22.03 kg/m²     Last documented pain score (0-10 scale):    Last Weight:   Wt Readings from Last 1 Encounters:   06/24/22 112 lb 12.8 oz (51.2 kg)     Mental Status:  oriented and alert    IV Access:  - None    Nursing Mobility/ADLs:  Walking   Dependent  Transfer  Dependent  Bathing  Dependent  Dressing  Dependent  Toileting  Dependent  Feeding  Dependent  Med Admin  Dependent  Med Delivery   whole    Wound Care Documentation and Therapy:  Wound 10/19/21 Leg Anterior; Left #1 (Active)   Dressing Status Clean;Dry; Intact 06/24/22 0845   Wound Cleansed Not Cleansed 06/24/22 0845   Dressing/Treatment Dry dressing;Petroleum gauze 06/24/22 0845   Dressing Change Due 06/25/22 06/24/22 0845   Wound Length (cm) 5 cm 06/24/22 0302   Wound Width (cm) 3 cm 06/24/22 0302   Wound Depth (cm) 0.2 cm 06/24/22 0302   Wound Surface Area (cm^2) 15 cm^2 06/24/22 0302   Change in Wound Size % (l*w) -14.33 06/24/22 0302   Wound Volume (cm^3) 3 cm^3 06/24/22 0302   Wound Healing % -129 06/24/22 0302   Wound Assessment Fibrinous; Slough; Non-blanchable erythema 06/24/22 0845   Drainage Amount Scant 06/24/22 0845   Drainage Description Thin 06/24/22 0845   Odor None 06/24/22 0845   Number of days: 248       Wound 06/24/22 Sacrum (Active)   Dressing Status Other (Comment) 06/24/22 0845   Wound Cleansed Soap and water 06/24/22 0845   Dressing/Treatment Open to air 06/24/22 0845   Wound Length (cm) 0.5 cm 06/24/22 0304   Wound Width (cm) 0.1 cm 06/24/22 0304   Wound Depth (cm) 0.1 cm 06/24/22 0304   Wound Surface Area (cm^2) 0.05 cm^2 06/24/22 0304   Wound Volume (cm^3) 0.005 cm^3 06/24/22 0304   Wound Assessment Ruptured blister 06/24/22 0845   Drainage Amount None 06/24/22 0845   Odor None 06/24/22 0845   Maggie-wound Assessment Fragile 06/24/22 0845   Number of days: 0        Elimination:  Continence: Bowel: {YES / DL:94081}  Bladder: {YES / NM:51229}  Urinary Catheter: None   Colostomy/Ileostomy/Ileal Conduit: {YES / TU:84797}       Date of Last BM: ***    Intake/Output Summary (Last 24 hours) at 6/24/2022 1200  Last data filed at 6/24/2022 0830  Gross per 24 hour   Intake 0 ml   Output 500 ml   Net -500 ml     I/O last 3 completed shifts:  In: -   Out: 500 [Urine:500]    Safety Concerns: At Risk for Falls    Impairments/Disabilities:      None    Nutrition Therapy:  Current Nutrition Therapy:   - Oral Diet:  General and soft and bite sized    Routes of Feeding: Oral  Liquids: {Slp liquid thickness:68994}  Daily Fluid Restriction: no  Last Modified Barium Swallow with Video (Video Swallowing Test): done on 06/28    Treatments at the Time of Hospital Discharge:   Respiratory Treatments: ***  Oxygen Therapy:  is not on home oxygen therapy.   Ventilator:    - No ventilator support    Rehab Therapies: {THERAPEUTIC INTERVENTION:6913771455}  Weight Bearing Status/Restrictions: No weight bearing restrictions  Other Medical Equipment (for information only, NOT a DME order):  {EQUIPMENT:335851415}  Other Treatments: ***    Patient's personal belongings (please select all that are sent with patient):  None    RN SIGNATURE:  Electronically signed by Biju Jaimes RN on 6/29/22 at 6:23 PM EDT    CASE MANAGEMENT/SOCIAL WORK SECTION    Inpatient Status Date: ***    Readmission Risk Assessment Score:  Readmission Risk              Risk of Unplanned Readmission:  0           Discharging to Facility/ Agency   Name: [facility]  Address: [address] [place] [address]  Phone: [phone]  Fax:    Dialysis Facility (if applicable)   Name:  Address:  Dialysis Schedule:  Phone:  Fax:    / signature: Electronically signed by SHAYY Michel on 6/24/22 at 12:00 PM EDT    PHYSICIAN SECTION    Prognosis: {Prognosis:7351685524}    Condition at Discharge: [facility] Patient Condition:022721170}    Rehab Potential (if transferring to Rehab): {Prognosis:5118882887}    Recommended Labs or Other Treatments After Discharge: ***    Physician Certification: I certify the above information and transfer of Antolin Thibodeaux  is necessary for the continuing treatment of the diagnosis listed and that she requires Intermediate Nursing Care for greater 30 days.      Update Admission H&P: {CHP DME Changes in JVKUS:601290899}    PHYSICIAN SIGNATURE:  {Esignature:731412966}

## 2022-06-24 NOTE — PROGRESS NOTES
SPEECH/LANGUAGE PATHOLOGY  CLINICAL ASSESSMENT OF SWALLOWING FUNCTION   and PLAN OF CARE    PATIENT NAME:  Hortencia Ceja  (female)     MRN:  [id number]    :  1945  (65 y.o.)  STATUS:  Inpatient: Room 0534/0534-A    TODAY'S DATE:  2022  REFERRING PROVIDER:  [facility]   SLP eval and treat Start: 22 0800, End: 22 0800, ONE TIME, Standing Count: 1 Occurrences, R Comments: Okay for video barium swallow   Nathaniel Black MD  REASON FOR REFERRAL: assess for aspiration   EVALUATING THERAPIST: CESAR Coelho                 RESULTS:    DYSPHAGIA DIAGNOSIS:   Clinical indicators of functional oropharyngeal swallow for age/premorbid functioning    A video swallow eval was completed at a [facility] facility on 2022 that did not show penetration or aspiration. Reviewed by this SLP. An esophagram was completed on 2022 with following results:     1.  No aspiration during swallowing.       2.  Presbyesophagus with prominent esophageal contractions and spasms causing   retention and delayed clearance.       3.  Food retention in the dilated upper esophagus despite patient not having   eaten since early yesterday.       4.  No hiatal hernia. The above would cause esophageal dysphagia which places pt at risk of aspiration after the swallow.        DIET RECOMMENDATIONS:  Diet per primary physician or referring GI, however from an oropharyngeal standpoint pt should tolerate an easy to chew diet with thin liquids     FEEDING RECOMMENDATIONS:     Assistance level:  Stand by assistance is needed during all oral intake      Compensatory strategies recommended: Small bites/sips      Discussed recommendations with nursing and/or faxed report to referring provider: Yes    SPEECH THERAPY  PLAN OF CARE   The dysphagia POC is established based on physician order, dysphagia diagnosis and results of clinical assessment     Meal time assessment for 1-2 sessions to provide diet modification and compensatory strategy implementation due to need to ensure proper implementation of compensatory strategies during PO intake     Conditions Requiring Skilled Therapeutic Intervention for dysphagia:    Patient is performing below functional baseline d/t  current acute condition, Multiple diagnoses, multiple medications, and increased dependency upon caregivers. Specific dysphagia interventions to include:     Meal time assessment for 1-2 sessions to provide diet modification and compensatory strategy implementation due to need to ensure proper implementation of compensatory strategies during PO intake     Specific instructions for next treatment:  initiate instruction of compensatory strategies  Patient Treatment Goals:    Short Term Goals:  Pt will participate in meal time assessment for 1-2 sessions to provide diet modification and compensatory strategy implementation due to need to ensure proper implementation of compensatory strategies during PO intake     Long Term Goals:   Pt will maintain adequate nutrition/hydration via PO intake of the least restrictive oral diet with implementation of safe swallow/ compensatory strategies and decrease signs/symptoms of aspiration to less than 1 x/day. Patient/family Goal:    Did not state. Will further assess during treatment.     Plan of care discussed with Patient   The Patient understand(s) the diagnosis, prognosis and plan of care     Rehabilitation Potential/Prognosis: fair                    ADMITTING DIAGNOSIS: Dysphagia [R13.10]    VISIT DIAGNOSIS:      PATIENT REPORT/COMPLAINT: denies difficulty swallowing  RN cleared patient for participation in assessment     yes     PRIOR LEVEL OF SWALLOW FUNCTION:    PAST HISTORY OF DYSPHAGIA?: yes    Home diet: Diet information not available  Current Diet Order:  Diet NPO    PROCEDURE:  Consistencies Administered During the Evaluation   Liquids: thin liquid   Solids:  pureed foods and solid foods      Method of Intake: cup, straw, spoon  Self fed, Fed by clinician      Position:   Seated, upright    CLINICAL ASSESSMENT:  Oral Stage:       Decreased mastication due to:  poor/missing dentition        Pharyngeal Stage:    No signs of aspiration were noted during this evaluation however, silent aspiration cannot be ruled out at bedside. If silent aspiration is suspected, a Videofluoroscopic Study of Swallowing (MBS) is recommended and requires a physician order. Cognition:   Confusion noted    Oral Peripheral Examination   Adequate lingual/labial strength     Current Respiratory Status    room air     Parameters of Speech Production  Respiration:  Adequate for speech production  Quality:   Within functional limits  Intensity: Within functional limits    Volitional Swallow: present     Volitional Cough:   present     Pain: No pain reported. EDUCATION:   The Speech Language Pathologist (SLP) completed education regarding results of evaluation and that intervention is warranted at this time. Learner: Patient  Education: Reviewed results and recommendations of this evaluation  Evaluation of Education:  No evidence of learning and Family not present    This plan may be re-evaluated and revised as warranted. Evaluation Time includes thorough review of current medical information, gathering information on past medical history/social history and prior level of function, completion of standardized testing/informal observation of tasks, assessment of data and education on plan of care and goals. INTERVENTION    Pt/family educated on above results and plan of care. Pt/family trained on compensatory strategies for safe swallow and signs and symptoms of aspiration (throat clearing, coughing/choking, wet vocal quality. , etc.) and encouraged to notify staff if observed. Handouts provided as warranted. Pt/family encouraged to engage in question/answer session. All questions answered and pt/family verbalized understanding of above. [x]The admitting diagnosis and active problem list, have been reviewed prior to initiation of this evaluation. ACTIVE PROBLEM LIST:   Patient Active Problem List   Diagnosis    CAD (coronary artery disease)    S/P AVR (aortic valve replacement)    S/P MVR (mitral valve repair)    HTN (hypertension)    Dyslipidemia    Diabetes mellitus ([address]    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 (HCC)    Ulcer of calf with fat layer exposed, left (HCC)    Decreased dorsalis pedis pulse    Hemiparesis of left nondominant side as late effect of cerebrovascular disease ([address]    Cannot walk    Squamous cell carcinoma of lower leg, left    Dysphagia    Dementia (HCC)    Hypothyroidism    Gastroesophageal reflux disease    Myocardial infarction ([address]    Major depressive disorder    Photophobia         CPT code:  88216  bedside swallow eval, 23461 dysphagia therapy    [facility] Room M. A. CCC/SLP [id number]  Speech-Language Pathologist

## 2022-06-24 NOTE — PROGRESS NOTES
Pharmacy Note    Shahbaz Enamorado was receiving oral levothyroxine prior to admission and intravenous levothyroxine has been ordered. Levothyroxine has a long half-life. Per the [address] restrictions for IV levothyroxine, this order has been modified to start 6 days from the date ordered. Please contact the Pharmacy with any questions or concerns.     Justice Martin, [address] [place] [address]  6/24/2022  1:40 AM

## 2022-06-24 NOTE — ED NOTES
This RN faxed the pts SBAR to the floor and called to verify they received it. Pt will be transported to the floor with staff.      Wallace Steele RN  06/24/22 [id number]

## 2022-06-24 NOTE — H&P
[facility] History and Physical      CHIEF COMPLAINT:  Dysphagia    History of Present Illness: This is a 65year old female with PMH significant for aortic stenosis, CAD, CABG x2, CVA, DM, HLD, HTN, and hypothyroidism. Sent in from [facility] [facility] for dysphagia. [facility] gastroenterology sent a prescription in for admission, EGD with Botox, and possible PEG insertion. Concern for high risk of aspiration. Upper GI series completed showing esophageal dilatation and esophageal retained food contents. Associated symptoms include belching and patient states, \"I feel like my food gets stuck. \" granddaughter at bedside and reports that patient is at risk for aspiration but has not aspirated yet. Patient denies fever, chills, cough, shortness of breath, chest pain, abdominal pain, and changes in bowel/bladder.     Informant(s) for H&P: Patient, granddaughter, and chart review    REVIEW OF SYSTEMS:  A comprehensive review of systems was negative except for: what is in the HPI      PMH:  Past Medical History:   Diagnosis Date    Abnormal stress ECG     Aortic stenosis     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 ([address] 10/19/2021    CAD (coronary artery disease)     Cannot walk 10/19/2021    Cerebrovascular disease     Decreased dorsalis pedis pulse 10/19/2021    Diabetes mellitus ([address]     Diverticulitis     dyslipidemia     Hemiparesis of left nondominant side as late effect of cerebrovascular disease ([address] 10/19/2021    Hypertension     Hypothyroidism     MI (myocardial infarction) ([address]     questionable 1986    Obesity     Osteoporosis     Squamous cell carcinoma of lower leg, left 11/2/2021    Thyroid disease     TIA (transient ischemic attack)     several post op TIAs    Type II or unspecified type diabetes mellitus without mention of complication, not stated as uncontrolled     Ulcer of calf with fat layer exposed, left ([address] 10/19/2021       Surgical History:  Past Surgical History:   Procedure Laterality Date    APPENDECTOMY      CARDIAC CATHETERIZATION  6/06/12    Heart Lab, EF 65%, two vessel CAD, total prox RCA wtih left to right collaterals, prox OM branch 80% and mild CX 40%, severe aortic stenosis. Surgery consult advised for CABG and AVR. [place] [facility] CARDIAC SURGERY  6/15/12    Epiaortic ultrasound scan. LISET. CABG x2 with reverse SVG to PDA, reverse SVG to second OMB. Aortic valve replacement with size 21 Mosaic ultra porcine heart valve. Mitral valve repair with insertion of size 28 future annuloplasty band, [address] VALVE REPLACEMENT  June 2012    AVR size 21 mosaic ultra porcine valve     CHOLECYSTECTOMY      CORONARY ARTERY BYPASS GRAFT  6/15/12    DIAGNOSTIC CARDIAC CATH LAB PROCEDURE      DILATATION, ESOPHAGUS      HERNIA REPAIR      HYSTERECTOMY      age 43    TONSILLECTOMY      VASCULAR SURGERY         Medications Prior to Admission:    Prior to Admission medications    Medication Sig Start Date End Date Taking? Authorizing Provider   donepezil (ARICEPT) 5 MG tablet Take 5 mg by mouth nightly    Historical Provider, MD   ascorbic acid (VITAMIN C) 500 MG tablet Take 500 mg by mouth daily    Historical Provider, MD   docusate sodium (COLACE) 100 MG capsule Take 100 mg by mouth 2 times daily    Historical Provider, MD   cyclobenzaprine (FLEXERIL) 5 MG tablet Take 5 mg by mouth 3 times daily as needed for Muscle spasms    Historical Provider, MD   losartan (COZAAR) 25 MG tablet Take 25 mg by mouth daily    Historical Provider, MD   melatonin 3 MG TABS tablet Take 3 mg by mouth daily    Historical Provider, MD   amLODIPine (NORVASC) 2.5 MG tablet Take 2.5 mg by mouth daily    Historical Provider, MD   pantoprazole (PROTONIX) 40 MG tablet Take 40 mg by mouth daily    Historical Provider, MD   traMADol (ULTRAM) 50 MG tablet Take 50 mg by mouth every 6 hours as needed for Pain.     Historical Provider, MD   zinc sulfate (ZINCATE) 220 (50 Zn) MG capsule Take 50 mg by mouth daily    Historical Provider, MD   ondansetron (ZOFRAN) 4 MG tablet Take 4 mg by mouth every 8 hours as needed for Nausea or Vomiting    Historical Provider, MD   Multiple Vitamins-Minerals (THERAPEUTIC MULTIVITAMIN-MINERALS) tablet Take 1 tablet by mouth daily    Historical Provider, MD   ibuprofen (ADVIL) 200 MG tablet Take 1 tablet by mouth every 8 hours as needed for Pain 10/19/21   Arabella Evangelista MD   CRESTOR 10 MG tablet Take 1 tablet by mouth daily. 8/13/13   Bayron Ernandez MD   levothyroxine (SYNTHROID) 50 MCG tablet Take 50 mcg by mouth daily. Historical Provider, MD   carvedilol (COREG) 3.125 MG tablet Take 3.125 mg by mouth 2 times daily (with meals). Historical Provider, MD   sertraline (ZOLOFT) 100 MG tablet Take 100 mg by mouth daily. One and a half a day    Historical Provider, MD   insulin glargine (LANTUS) 100 UNIT/ML injection Inject 30 Units into the skin 2 times daily. Historical Provider, MD       Allergies:    Phenergan [promethazine hcl]    Social History:    reports that she quit smoking about 52 years ago. She smoked 0.50 packs per day. She has never used smokeless tobacco. She reports that she does not drink alcohol and does not use drugs. Family History:   family history includes Heart Disease in her brother and father; High Blood Pressure in her brother and father. PHYSICAL EXAM:  Vitals:  BP (!) 190/77   Pulse 70   Temp 98.6 °F (37 °C) (Oral)   Resp 16   Ht 5' (1.524 m)   Wt 120 lb (54.4 kg)   SpO2 99%   BMI 23.44 kg/m²     General Appearance: alert and oriented to person only.   In no acute distress  Skin: warm and dry  Head: normocephalic and atraumatic  Eyes: pupils equal, round, and reactive to light, conjunctivae normal  Pulmonary/Chest: clear to auscultation bilaterally- no wheezes, rales or rhonchi, normal air movement, no respiratory distress  Cardiovascular: normal rate, normal S1 and S2   Abdomen: soft, non-tender, non-distended, normal bowel sounds, no masses or organomegaly  Extremities: no cyanosis, no clubbing and no edema  Neurologic: Confused. Speech normal        LABS:  Recent Labs     06/23/22 2222      K 3.9      CO2 25   BUN 11   CREATININE 0.5   GLUCOSE 119*   CALCIUM 8.5*       Recent Labs     06/23/22 2222   WBC 8.1   RBC 3.12*   HGB 9.8*   HCT 29.7*   MCV 95.2   MCH 31.4   MCHC 33.0   RDW 13.2      MPV 10.3       No results for input(s): POCGLU in the last 72 hours. CBC:   Lab Results   Component Value Date    WBC 8.1 06/23/2022    RBC 3.12 06/23/2022    HGB 9.8 06/23/2022    HCT 29.7 06/23/2022    MCV 95.2 06/23/2022    MCH 31.4 06/23/2022    MCHC 33.0 06/23/2022    RDW 13.2 06/23/2022     06/23/2022    MPV 10.3 06/23/2022     BMP:    Lab Results   Component Value Date     06/23/2022    K 3.9 06/23/2022     06/23/2022    CO2 25 06/23/2022    BUN 11 06/23/2022    LABALBU 2.7 06/23/2022    LABALBU 4.4 06/04/2012    CREATININE 0.5 06/23/2022    CALCIUM 8.5 06/23/2022    GFRAA >60 06/23/2022    LABGLOM >60 06/23/2022    GLUCOSE 119 06/23/2022    GLUCOSE 130 06/04/2012       Radiology:   No orders to display       EKG: Not available    ASSESSMENT:      Principal Problem:    Dysphagia  Active Problems:    CAD (coronary artery disease)    HTN (hypertension)    Diabetes mellitus ([facility]  Resolved Problems:    * No resolved hospital problems. *      PLAN:    1. Dysphagia-GI consulted. Plans for EGD tomorrow with Botox and possible PEG insertion. Patient n.p.o.  IV fluids at 50 an hour. 2.  HTN-patient n.p.o. Lopressor IV as needed and hydralazine IV as needed for systolic blood pressure greater than 160.  3. DM-Lantus held at this time due to n.p.o. status. A [facility] blood sugar checks with low insulin SSC. Hypoglycemic protocol. 4.  Hypothyroid-resume Synthroid after procedure done and okay to swallow.     Code Status: Full code DVT prophylaxis: Lovenox      NOTE: This report was transcribed using voice recognition software. Every effort was made to ensure accuracy; however, inadvertent computerized transcription errors may be present. Electronically signed by YOLIS Vieira CNP on 6/24/2022 at 1:32 AM    Attending Physician Statement:  Eric Paul M.D., F.A.C.P. I have discussed the case, including pertinent history and exam findings with the resident/NP. I have seen and examined the patient and the key elements of the encounter have been performed by me. I agree with the resident ROS, PMHx, PSHx, meds reviewed and assessment, plan and orders as documented by the resident/NP      Hospital charts reviewed, including other providers notes, relevant labs and imaging. Upper GI series completed showing esophageal dilatation and esophageal retained food contents. Associated symptoms include belching and patient states, \"I feel like my food gets stuck. \" granddaughter at bedside and reports that patient is at risk for aspiration but has not aspirated yet. No acute complaints - chronic baseline state. Sent in from [facility] [facility] for dysphagia. [facility] for admission, EGD with Botox, and possible PEG insertion. Concern for high risk of aspiration    Gentle IV hydration, will NPO   aortic stenosis, CAD, CABG x2, - stable  CVA,   DM, stable- will give SS  HLD, HTN,- plan prn BP meds IV- bc NPO   and hypothyroidism- 1/2 dose IV thyroid. 30min spent my time  >50% of time spent coordinating care with other providers and/or counseling patient/family  Remainder of medical problems as per resident note.

## 2022-06-25 LAB
BASOPHILS ABSOLUTE: 0.07 E9/L (ref 0–0.2)
BASOPHILS RELATIVE PERCENT: 0.9 % (ref 0–2)
EOSINOPHILS ABSOLUTE: 0.06 E9/L (ref 0.05–0.5)
EOSINOPHILS RELATIVE PERCENT: 0.7 % (ref 0–6)
FERRITIN: 1296 NG/ML
FOLATE: 19.2 NG/ML (ref 4.8–24.2)
HBA1C MFR BLD: 4.5 % (ref 4–5.6)
HCT VFR BLD CALC: 32.5 % (ref 34–48)
HEMOGLOBIN: 10.6 G/DL (ref 11.5–15.5)
IMMATURE GRANULOCYTES #: 0.04 E9/L
IMMATURE GRANULOCYTES %: 0.5 % (ref 0–5)
IRON SATURATION: 38 % (ref 15–50)
IRON: 54 MCG/DL (ref 37–145)
LYMPHOCYTES ABSOLUTE: 1.96 E9/L (ref 1.5–4)
LYMPHOCYTES RELATIVE PERCENT: 24.5 % (ref 20–42)
MCH RBC QN AUTO: 31.2 PG (ref 26–35)
MCHC RBC AUTO-ENTMCNC: 32.6 % (ref 32–34.5)
MCV RBC AUTO: 95.6 FL (ref 80–99.9)
METER GLUCOSE: 107 MG/DL (ref 74–99)
METER GLUCOSE: 111 MG/DL (ref 74–99)
METER GLUCOSE: 120 MG/DL (ref 74–99)
METER GLUCOSE: 127 MG/DL (ref 74–99)
MONOCYTES ABSOLUTE: 0.77 E9/L (ref 0.1–0.95)
MONOCYTES RELATIVE PERCENT: 9.6 % (ref 2–12)
NEUTROPHILS ABSOLUTE: 5.11 E9/L (ref 1.8–7.3)
NEUTROPHILS RELATIVE PERCENT: 63.8 % (ref 43–80)
PDW BLD-RTO: 13.4 FL (ref 11.5–15)
PLATELET # BLD: 366 E9/L (ref 130–450)
PMV BLD AUTO: 11 FL (ref 7–12)
RBC # BLD: 3.4 E12/L (ref 3.5–5.5)
TOTAL IRON BINDING CAPACITY: 141 MCG/DL (ref 250–450)
VITAMIN B-12: 656 PG/ML (ref 211–946)
WBC # BLD: 8 E9/L (ref 4.5–11.5)

## 2022-06-25 PROCEDURE — 6360000002 HC RX W HCPCS: Performed by: NURSE PRACTITIONER

## 2022-06-25 PROCEDURE — 83550 IRON BINDING TEST: CPT

## 2022-06-25 PROCEDURE — APPSS30 APP SPLIT SHARED TIME 16-30 MINUTES: Performed by: NURSE PRACTITIONER

## 2022-06-25 PROCEDURE — 82962 GLUCOSE BLOOD TEST: CPT

## 2022-06-25 PROCEDURE — 36415 COLL VENOUS BLD VENIPUNCTURE: CPT

## 2022-06-25 PROCEDURE — C9113 INJ PANTOPRAZOLE SODIUM, VIA: HCPCS | Performed by: NURSE PRACTITIONER

## 2022-06-25 PROCEDURE — 96376 TX/PRO/DX INJ SAME DRUG ADON: CPT

## 2022-06-25 PROCEDURE — 82607 VITAMIN B-12: CPT

## 2022-06-25 PROCEDURE — 99233 SBSQ HOSP IP/OBS HIGH 50: CPT | Performed by: INTERNAL MEDICINE

## 2022-06-25 PROCEDURE — G0378 HOSPITAL OBSERVATION PER HR: HCPCS

## 2022-06-25 PROCEDURE — 85025 COMPLETE CBC W/AUTO DIFF WBC: CPT

## 2022-06-25 PROCEDURE — 2580000003 HC RX 258: Performed by: NURSE PRACTITIONER

## 2022-06-25 PROCEDURE — 82746 ASSAY OF FOLIC ACID SERUM: CPT

## 2022-06-25 PROCEDURE — 1200000000 HC SEMI PRIVATE

## 2022-06-25 PROCEDURE — 83036 HEMOGLOBIN GLYCOSYLATED A1C: CPT

## 2022-06-25 PROCEDURE — A4216 STERILE WATER/SALINE, 10 ML: HCPCS | Performed by: NURSE PRACTITIONER

## 2022-06-25 PROCEDURE — 82728 ASSAY OF FERRITIN: CPT

## 2022-06-25 PROCEDURE — 2580000003 HC RX 258: Performed by: INTERNAL MEDICINE

## 2022-06-25 PROCEDURE — 83540 ASSAY OF IRON: CPT

## 2022-06-25 RX ORDER — KETOROLAC TROMETHAMINE 30 MG/ML
15 INJECTION, SOLUTION INTRAMUSCULAR; INTRAVENOUS ONCE
Status: COMPLETED | OUTPATIENT
Start: 2022-06-25 | End: 2022-06-25

## 2022-06-25 RX ORDER — ACETAMINOPHEN 160 MG/5ML
650 SOLUTION ORAL EVERY 6 HOURS PRN
Status: DISCONTINUED | OUTPATIENT
Start: 2022-06-25 | End: 2022-06-30 | Stop reason: HOSPADM

## 2022-06-25 RX ADMIN — ONDANSETRON 4 MG: 2 INJECTION INTRAMUSCULAR; INTRAVENOUS at 16:18

## 2022-06-25 RX ADMIN — Medication 10 ML: at 08:48

## 2022-06-25 RX ADMIN — KETOROLAC TROMETHAMINE 15 MG: 30 INJECTION, SOLUTION INTRAMUSCULAR; INTRAVENOUS at 12:54

## 2022-06-25 RX ADMIN — HYDRALAZINE HYDROCHLORIDE 10 MG: 20 INJECTION INTRAMUSCULAR; INTRAVENOUS at 02:08

## 2022-06-25 RX ADMIN — SODIUM CHLORIDE, PRESERVATIVE FREE 40 MG: 5 INJECTION INTRAVENOUS at 08:00

## 2022-06-25 RX ADMIN — ENOXAPARIN SODIUM 40 MG: 100 INJECTION SUBCUTANEOUS at 08:00

## 2022-06-25 RX ADMIN — SODIUM CHLORIDE, POTASSIUM CHLORIDE, SODIUM LACTATE AND CALCIUM CHLORIDE: 600; 310; 30; 20 INJECTION, SOLUTION INTRAVENOUS at 10:44

## 2022-06-25 ASSESSMENT — PAIN DESCRIPTION - DESCRIPTORS: DESCRIPTORS: ACHING

## 2022-06-25 ASSESSMENT — PAIN - FUNCTIONAL ASSESSMENT: PAIN_FUNCTIONAL_ASSESSMENT: ACTIVITIES ARE NOT PREVENTED

## 2022-06-25 ASSESSMENT — PAIN SCALES - GENERAL: PAINLEVEL_OUTOF10: 9

## 2022-06-25 NOTE — PLAN OF CARE
Problem: Safety - Adult  Goal: Free from fall injury  Outcome: Progressing     Problem: ABCDS Injury Assessment  Goal: Absence of physical injury  Outcome: Progressing     Problem: Skin/Tissue Integrity  Goal: Absence of new skin breakdown  Outcome: Progressing

## 2022-06-25 NOTE — PROGRESS NOTES
pulse    Hemiparesis of left nondominant side as late effect of cerebrovascular disease (HCC)    Cannot walk    Squamous cell carcinoma of lower leg, left    Dysphagia    Dementia (HCC)    Hypothyroidism    Gastroesophageal reflux disease    Myocardial infarction ([facility]    Major depressive disorder    Photophobia     1. Dysphagia  -Abnormal UGI 6/13  -Plan for further evaluation and treatment with upper endoscopy with Botox injection -plan for procedure Monday/Tuesday of coming week  -Clear liquid diet in the meanwhile     2. Anemia  -Acute on chronic  -No evidence of overt bleed  -Stable H&H  -No significant iron deficiency  -Pending FOBT  -EGD as above  -Outpatient colonoscopy -TBD     Above has been discussed with patient's daughter over the phone yesterday and all questions answered to her satisfaction. She verbalized understanding and agreement with the plan as delineated    Alhaji Prather MD  6/25/2022  4:12 PM    NOTE:  This report was transcribed using voice recognition software. Every effort was made to ensure accuracy; however, inadvertent computerized transcription errors may be present.

## 2022-06-25 NOTE — PROGRESS NOTES
[facility] Progress Note    Admitting Date and Time: 6/23/2022  9:37 PM  Admit Dx: Dysphagia [R13.10]    Subjective:  Patient is being followed for Dysphagia [R13.10]     Patient resting and sleeping on exam- awakens easily  Reporting she had a rough night and is \" very tired \"   Reporting she was very \" cold \" overnight  No issues per nursing   \"Just wanting to go back to sleep\"     ROS: denies fever, chills, cp, sob, n/v, HA unless stated above.  sodium chloride flush  5-40 mL IntraVENous 2 times per day    enoxaparin  40 mg SubCUTAneous Daily    [START ON 6/29/2022] levothyroxine  37.5 mcg IntraVENous Daily    insulin lispro  0-6 Units SubCUTAneous TID WC    insulin lispro  0-3 Units SubCUTAneous Nightly    pantoprazole (PROTONIX) 40 mg injection  40 mg IntraVENous Daily     sodium chloride flush, 5-40 mL, PRN  sodium chloride, , PRN  ondansetron, 4 mg, Q8H PRN   Or  ondansetron, 4 mg, Q6H PRN  polyethylene glycol, 17 g, Daily PRN  acetaminophen, 650 mg, Q6H PRN   Or  acetaminophen, 650 mg, Q6H PRN  metoprolol, 5 mg, Q6H PRN  hydrALAZINE, 10 mg, Q6H PRN  glucose, 4 tablet, PRN  dextrose bolus, 125 mL, PRN   Or  dextrose bolus, 250 mL, PRN  glucagon (rDNA), 1 mg, PRN  dextrose, 100 mL/hr, PRN         Objective:    BP (!) 149/74   Pulse 84   Temp 98 °F (36.7 °C) (Oral)   Resp 16   Ht 5' (1.524 m)   Wt 112 lb (50.8 kg)   SpO2 99%   BMI 21.87 kg/m²   General Appearance: sleeping- awakens easily. Tired.  Oriented   Skin: warm and dry  Head: normocephalic and atraumatic  Neck: neck supple and non tender without mass   Pulmonary/Chest: clear to auscultation bilaterally-  Cardiovascular: normal rate, normal S1 and S2 and no carotid bruits  Abdomen: soft, non-tender, non-distended, normal bowel sounds  Extremities: no cyanosis, no clubbing and no edema, muscle atrophy of lower legs, left hand contracted   Neurologic: speech normal         Recent Labs     06/23/22  2222 06/24/22  0914  136   K 3.9 3.6    102   CO2 25 22   BUN 11 10   CREATININE 0.5 0.5   GLUCOSE 119* 110*   CALCIUM 8.5* 8.8       Recent Labs     06/23/22  2222 06/25/22  0451   WBC 8.1 8.0   RBC 3.12* 3.40*   HGB 9.8* 10.6*   HCT 29.7* 32.5*   MCV 95.2 95.6   MCH 31.4 31.2   MCHC 33.0 32.6   RDW 13.2 13.4    366   MPV 10.3 11.0       Radiology:     Assessment:    Principal Problem:    Dysphagia  Active Problems:    CAD (coronary artery disease)    HTN (hypertension)    Diabetes mellitus ([address]  Resolved Problems:    * No resolved hospital problems. *      Plan:  1. Dysphagia: patient was sent in from nursing home to ER due to concerns for dysphagia. Pt reporting she felt like \" food was getting stuck. \" associated belching. Pt has been following with GI outpt. Concern that she was a high risk for aspiration and plan was for admission and EGD with possible botox/ possible need for peg. . She had an abnormal UGI outpt on 6/13-  Abnormal esophageal motility with combined findings of diffuse esophageal spasm and achalasia type pattern. Speech therapy was consulted here and feel that pt should be able to tolerate thin liquids. Easy to chew diet currently. Currently on clear liquids. Plan is for endoscopy early next week Monday / Tuesday. Clear liquids in mean time per GI.     2. HTN: pt on norvasc 2.5, coreg 3.125 BID, cozaar 25 daily. Due to current swallowing issue- pills were placed on hold and changed to IV. Heatherenlty on lopressor 5mg q6 prn    3. DM: check hga1c- continue insulin ss    4. Thyroid disease: continue synthroid IV for now     5. Acute on chronic anemia: anemia panel reviewed. 6. Deconditioning: pt resides at [facility]- noted muscle atrophy of lower ext. Non ambulatory. Dispo: social work following -pt from [facility]/ [facility]- with plans to return there. Pt kiran a bed hold- no need for precert to return       NOTE: This report was transcribed using voice recognition software.  Every effort was made to ensure accuracy; however, inadvertent computerized transcription errors may be present.   Electronically signed by LILI Mclean on 6/25/2022 at 8:55 AM

## 2022-06-26 ENCOUNTER — ANESTHESIA EVENT (OUTPATIENT)
Dept: ENDOSCOPY | Age: 77
DRG: 392 | End: 2022-06-26
Payer: COMMERCIAL

## 2022-06-26 ENCOUNTER — ANESTHESIA (OUTPATIENT)
Dept: ENDOSCOPY | Age: 77
DRG: 392 | End: 2022-06-26
Payer: COMMERCIAL

## 2022-06-26 LAB
ANION GAP SERPL CALCULATED.3IONS-SCNC: 12 MMOL/L (ref 7–16)
BUN BLDV-MCNC: 5 MG/DL (ref 6–23)
CALCIUM SERPL-MCNC: 9 MG/DL (ref 8.6–10.2)
CHLORIDE BLD-SCNC: 100 MMOL/L (ref 98–107)
CO2: 25 MMOL/L (ref 22–29)
CREAT SERPL-MCNC: 0.5 MG/DL (ref 0.5–1)
GFR AFRICAN AMERICAN: >60
GFR NON-AFRICAN AMERICAN: >60 ML/MIN/1.73
GLUCOSE BLD-MCNC: 99 MG/DL (ref 74–99)
HCT VFR BLD CALC: 33.5 % (ref 34–48)
HEMOGLOBIN: 11 G/DL (ref 11.5–15.5)
MAGNESIUM: 1.4 MG/DL (ref 1.6–2.6)
MCH RBC QN AUTO: 31.4 PG (ref 26–35)
MCHC RBC AUTO-ENTMCNC: 32.8 % (ref 32–34.5)
MCV RBC AUTO: 95.7 FL (ref 80–99.9)
METER GLUCOSE: 104 MG/DL (ref 74–99)
METER GLUCOSE: 113 MG/DL (ref 74–99)
METER GLUCOSE: 125 MG/DL (ref 74–99)
METER GLUCOSE: 143 MG/DL (ref 74–99)
METER GLUCOSE: 93 MG/DL (ref 74–99)
PDW BLD-RTO: 13.8 FL (ref 11.5–15)
PLATELET # BLD: 390 E9/L (ref 130–450)
PMV BLD AUTO: 10.7 FL (ref 7–12)
POTASSIUM SERPL-SCNC: 3.1 MMOL/L (ref 3.5–5)
RBC # BLD: 3.5 E12/L (ref 3.5–5.5)
SODIUM BLD-SCNC: 137 MMOL/L (ref 132–146)
WBC # BLD: 8.3 E9/L (ref 4.5–11.5)

## 2022-06-26 PROCEDURE — 83735 ASSAY OF MAGNESIUM: CPT

## 2022-06-26 PROCEDURE — 85027 COMPLETE CBC AUTOMATED: CPT

## 2022-06-26 PROCEDURE — 80048 BASIC METABOLIC PNL TOTAL CA: CPT

## 2022-06-26 PROCEDURE — 2580000003 HC RX 258: Performed by: INTERNAL MEDICINE

## 2022-06-26 PROCEDURE — A4216 STERILE WATER/SALINE, 10 ML: HCPCS | Performed by: NURSE PRACTITIONER

## 2022-06-26 PROCEDURE — 82962 GLUCOSE BLOOD TEST: CPT

## 2022-06-26 PROCEDURE — 36415 COLL VENOUS BLD VENIPUNCTURE: CPT

## 2022-06-26 PROCEDURE — C9113 INJ PANTOPRAZOLE SODIUM, VIA: HCPCS | Performed by: NURSE PRACTITIONER

## 2022-06-26 PROCEDURE — 2580000003 HC RX 258: Performed by: NURSE PRACTITIONER

## 2022-06-26 PROCEDURE — 1200000000 HC SEMI PRIVATE

## 2022-06-26 PROCEDURE — 99232 SBSQ HOSP IP/OBS MODERATE 35: CPT | Performed by: INTERNAL MEDICINE

## 2022-06-26 PROCEDURE — 6360000002 HC RX W HCPCS: Performed by: NURSE PRACTITIONER

## 2022-06-26 PROCEDURE — 6370000000 HC RX 637 (ALT 250 FOR IP): Performed by: NURSE PRACTITIONER

## 2022-06-26 PROCEDURE — APPSS30 APP SPLIT SHARED TIME 16-30 MINUTES: Performed by: NURSE PRACTITIONER

## 2022-06-26 PROCEDURE — 2500000003 HC RX 250 WO HCPCS: Performed by: NURSE PRACTITIONER

## 2022-06-26 RX ORDER — MAGNESIUM SULFATE IN WATER 40 MG/ML
2000 INJECTION, SOLUTION INTRAVENOUS ONCE
Status: COMPLETED | OUTPATIENT
Start: 2022-06-26 | End: 2022-06-26

## 2022-06-26 RX ORDER — MORPHINE SULFATE 2 MG/ML
2 INJECTION, SOLUTION INTRAMUSCULAR; INTRAVENOUS EVERY 4 HOURS PRN
Status: DISCONTINUED | OUTPATIENT
Start: 2022-06-26 | End: 2022-06-30 | Stop reason: HOSPADM

## 2022-06-26 RX ADMIN — Medication 10 ML: at 20:30

## 2022-06-26 RX ADMIN — MORPHINE SULFATE 2 MG: 2 INJECTION, SOLUTION INTRAMUSCULAR; INTRAVENOUS at 12:30

## 2022-06-26 RX ADMIN — MORPHINE SULFATE 2 MG: 2 INJECTION, SOLUTION INTRAMUSCULAR; INTRAVENOUS at 16:43

## 2022-06-26 RX ADMIN — ACETAMINOPHEN ORAL SOLUTION 650 MG: 650 SOLUTION ORAL at 11:53

## 2022-06-26 RX ADMIN — SODIUM CHLORIDE, POTASSIUM CHLORIDE, SODIUM LACTATE AND CALCIUM CHLORIDE: 600; 310; 30; 20 INJECTION, SOLUTION INTRAVENOUS at 20:31

## 2022-06-26 RX ADMIN — METOPROLOL TARTRATE 5 MG: 1 INJECTION, SOLUTION INTRAVENOUS at 21:29

## 2022-06-26 RX ADMIN — POTASSIUM BICARBONATE 20 MEQ: 782 TABLET, EFFERVESCENT ORAL at 09:42

## 2022-06-26 RX ADMIN — SODIUM CHLORIDE, PRESERVATIVE FREE 40 MG: 5 INJECTION INTRAVENOUS at 08:27

## 2022-06-26 RX ADMIN — ENOXAPARIN SODIUM 40 MG: 100 INJECTION SUBCUTANEOUS at 08:27

## 2022-06-26 RX ADMIN — ONDANSETRON 4 MG: 2 INJECTION INTRAMUSCULAR; INTRAVENOUS at 11:53

## 2022-06-26 RX ADMIN — MAGNESIUM SULFATE HEPTAHYDRATE 2000 MG: 40 INJECTION, SOLUTION INTRAVENOUS at 13:18

## 2022-06-26 ASSESSMENT — PAIN SCALES - GENERAL
PAINLEVEL_OUTOF10: 9
PAINLEVEL_OUTOF10: 7

## 2022-06-26 ASSESSMENT — PAIN - FUNCTIONAL ASSESSMENT: PAIN_FUNCTIONAL_ASSESSMENT: PREVENTS OR INTERFERES SOME ACTIVE ACTIVITIES AND ADLS

## 2022-06-26 ASSESSMENT — PAIN DESCRIPTION - LOCATION: LOCATION: ABDOMEN;BACK

## 2022-06-26 ASSESSMENT — PAIN DESCRIPTION - DESCRIPTORS: DESCRIPTORS: DISCOMFORT;ACHING

## 2022-06-26 NOTE — PROGRESS NOTES
PROGRESS NOTE  By Pau Adame M.D. [facility]  Dr. Yessi Hendricks M.D.,  Dr. Eladia Rojas M.D.,   Dr. Jose Angel Bennett D.O.,  Dr. Min Dnaielle M.D.,  Dr. Marlee Mendoza D.O.,  Corina Mccartney D.O. Aby Pak  65 y.o.  female    SUBJECTIVE:  No new complaints. No family at bedside    OBJECTIVE:    /62   Pulse (!) 107   Temp 98.5 °F (36.9 °C) (Oral)   Resp 16   Ht 5' (1.524 m)   Wt 148 lb (67.1 kg)   SpO2 98%   BMI 28.90 kg/m²     General: NAD/ female  HEENT: Anicteric sclera/moist oral mucosa  Neck: Supple with trachea midline  Chest: CTA B  Cor: Regular/S1-S2  Abd.: Soft and obese  Extr.:  No peripheral edema  Skin: Warm and dry      DATA:    Monitor data reviewed -sinus rhythm noted.        Lab Results   Component Value Date    WBC 8.3 06/26/2022    RBC 3.50 06/26/2022    HGB 11.0 06/26/2022    HCT 33.5 06/26/2022    MCV 95.7 06/26/2022    MCH 31.4 06/26/2022    MCHC 32.8 06/26/2022    RDW 13.8 06/26/2022     06/26/2022    MPV 10.7 06/26/2022     Lab Results   Component Value Date     06/26/2022    K 3.1 06/26/2022    K 3.6 06/24/2022     06/26/2022    CO2 25 06/26/2022    BUN 5 06/26/2022    CREATININE 0.5 06/26/2022    CALCIUM 9.0 06/26/2022    PROT 6.1 06/24/2022    LABALBU 2.7 06/24/2022    LABALBU 4.4 06/04/2012    BILITOT 0.7 06/24/2022    ALKPHOS 126 06/24/2022    AST 20 06/24/2022    ALT 13 06/24/2022     No results found for: LIPASE  No results found for: AMYLASE      ASSESSMENT/PLAN:  Patient Active Problem List   Diagnosis    CAD (coronary artery disease)    S/P AVR (aortic valve replacement)    S/P MVR (mitral valve repair)    HTN (hypertension)    Dyslipidemia    Diabetes mellitus ([address]    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 (HCC)    Ulcer of calf with fat layer exposed, left (HCC)    Decreased dorsalis pedis pulse    Hemiparesis of left nondominant side as late effect of cerebrovascular disease ([facility]    Cannot walk    Squamous cell carcinoma of lower leg, left    Dysphagia    Dementia (HCC)    Hypothyroidism    Gastroesophageal reflux disease    Myocardial infarction (HCC)    Major depressive disorder    Photophobia    Anemia    Hypomagnesemia     1.  [id number]  -Abnormal UGI 6/13  -Plan for further evaluation and treatment with upper endoscopy with Botox injection -plan for procedure Monday/Tuesday of coming week depending: Botox availability  -Clear liquid diet in the meanwhile  -N.p.o. after midnight  -Please, see orders     2.  Anemia  -Acute on chronic  -No evidence of overt bleed  -Stable H&H  -No significant iron deficiency  -Pending FOBT  -EGD as above  -Outpatient colonoscopy -TBD     Above has been discussed with patient's daughter over the phone 6/24 and all questions answered to her satisfaction. Dileep Colunga verbalized understanding and agreement with the plan as delineated     Atif Haskins MD  6/26/2022  2:52 PM    NOTE:  This report was transcribed using voice recognition software. Every effort was made to ensure accuracy; however, inadvertent computerized transcription errors may be present.

## 2022-06-26 NOTE — ANESTHESIA PRE PROCEDURE
Department of Anesthesiology  Preprocedure Note       Name:  Melania Lipoma   Age:  65 y.o.  :  1945                                          MRN:  [id number]         Date:  2022      Surgeon: * No surgeons listed *    Procedure: * No procedures listed *    Medications prior to admission:   Prior to Admission medications    Medication Sig Start Date End Date Taking? Authorizing Provider   donepezil (ARICEPT) 5 MG tablet Take 5 mg by mouth nightly    Historical Provider, MD   ascorbic acid (VITAMIN C) 500 MG tablet Take 500 mg by mouth daily    Historical Provider, MD   docusate sodium (COLACE) 100 MG capsule Take 100 mg by mouth 2 times daily    Historical Provider, MD   cyclobenzaprine (FLEXERIL) 5 MG tablet Take 5 mg by mouth 3 times daily as needed for Muscle spasms    Historical Provider, MD   losartan (COZAAR) 25 MG tablet Take 25 mg by mouth daily    Historical Provider, MD   melatonin 3 MG TABS tablet Take 3 mg by mouth daily    Historical Provider, MD   amLODIPine (NORVASC) 2.5 MG tablet Take 2.5 mg by mouth daily    Historical Provider, MD   pantoprazole (PROTONIX) 40 MG tablet Take 40 mg by mouth daily    Historical Provider, MD   traMADol (ULTRAM) 50 MG tablet Take 50 mg by mouth every 6 hours as needed for Pain. Historical Provider, MD   zinc sulfate (ZINCATE) 220 (50 Zn) MG capsule Take 50 mg by mouth daily    Historical Provider, MD   ondansetron (ZOFRAN) 4 MG tablet Take 4 mg by mouth every 8 hours as needed for Nausea or Vomiting    Historical Provider, MD   Multiple Vitamins-Minerals (THERAPEUTIC MULTIVITAMIN-MINERALS) tablet Take 1 tablet by mouth daily    Historical Provider, MD   ibuprofen (ADVIL) 200 MG tablet Take 1 tablet by mouth every 8 hours as needed for Pain 10/19/21   Michaelle Sullivan MD   CRESTOR 10 MG tablet Take 1 tablet by mouth daily. 13   Augusto Knutson MD   levothyroxine (SYNTHROID) 50 MCG tablet Take 50 mcg by mouth daily.       Historical Provider, MD carvedilol (COREG) 3.125 MG tablet Take 3.125 mg by mouth 2 times daily (with meals). Historical Provider, MD   sertraline (ZOLOFT) 100 MG tablet Take 100 mg by mouth daily. One and a half a day    Historical Provider, MD   insulin glargine (LANTUS) 100 UNIT/ML injection Inject 30 Units into the skin 2 times daily.     Historical Provider, MD       Current medications:    Current Facility-Administered Medications   Medication Dose Route Frequency Provider Last Rate Last Admin    morphine (PF) injection 2 mg  2 mg IntraVENous Q4H PRN Azerbaijani Guru, APN   2 mg at 06/26/22 1643    onabotulinumtoxin A (BOTOX (COSMETIC)) injection 100 Units  100 Units IntraMUSCular On Call to Massiel Norton MD        acetaminophen (TYLENOL) 160 MG/5ML solution 650 mg  650 mg Oral Q6H PRN Azerbaijani Guru, APN   650 mg at 06/26/22 1153    sodium chloride flush 0.9 % injection 5-40 mL  5-40 mL IntraVENous 2 times per day Casillas Rota, APRN - CNP   10 mL at 06/25/22 0848    sodium chloride flush 0.9 % injection 5-40 mL  5-40 mL IntraVENous PRN Casillas Rota, APRN - CNP        0.9 % sodium chloride infusion   IntraVENous PRN Casillas Rota, APRN - CNP        enoxaparin (LOVENOX) injection 40 mg  40 mg SubCUTAneous Daily Casillas Rota, APRN - CNP   40 mg at 06/26/22 0827    ondansetron (ZOFRAN-ODT) disintegrating tablet 4 mg  4 mg Oral Q8H PRN Casillas Rota, APRN - CNP        Or    ondansetron (ZOFRAN) injection 4 mg  4 mg IntraVENous Q6H PRN Casillas Rota, APRN - CNP   4 mg at 06/26/22 1153    polyethylene glycol (GLYCOLAX) packet 17 g  17 g Oral Daily PRN Casillas Rota, APRN - CNP        acetaminophen (TYLENOL) suppository 650 mg  650 mg Rectal Q6H PRN Casillas Rota, APRN - CNP        metoprolol (LOPRESSOR) injection 5 mg  5 mg IntraVENous Q6H PRN Casillas Rota, APRN - CNP   5 mg at 06/24/22 2058    hydrALAZINE (APRESOLINE) injection 10 mg  10 mg IntraVENous Q6H PRN Rubi Chun Reva APRN - CNP   10 mg at 06/25/22 0208    [START ON 6/29/2022] levothyroxine (SYNTHROID) injection 37.5 mcg  37.5 mcg IntraVENous Daily Lupie Crank, APRN - CNP        insulin lispro (HUMALOG) injection vial 0-6 Units  0-6 Units SubCUTAneous TID WC Lupie Crank, APRN - CNP        insulin lispro (HUMALOG) injection vial 0-3 Units  0-3 Units SubCUTAneous Nightly Lupie Crank, APRN - CNP        glucose chewable tablet 16 g  4 tablet Oral PRN Lupie Crank, APRN - CNP        dextrose bolus 10% 125 mL  125 mL IntraVENous PRN Lupie Crank, APRN - CNP        Or    dextrose bolus 10% 250 mL  250 mL IntraVENous PRN Lupie Crank, APRN - CNP        glucagon (rDNA) injection 1 mg  1 mg IntraMUSCular PRN Lupie Crank, APRN - CNP        dextrose 5 % solution  100 mL/hr IntraVENous PRN Lupie Crank, APRN - CNP        lactated ringers infusion   IntraVENous Continuous Harley Colunga MD 75 mL/hr at 06/25/22 1044 New Bag at 06/25/22 1044    pantoprazole (PROTONIX) 40 mg in sodium chloride (PF) 10 mL injection  40 mg IntraVENous Daily Lupie Crank, APRN - CNP   40 mg at 06/26/22 0827       Allergies:     Allergies   Allergen Reactions    Phenergan [Promethazine Hcl]        Problem List:    Patient Active Problem List   Diagnosis Code    CAD (coronary artery disease) I25.10    S/P AVR (aortic valve replacement) Z95.2    S/P MVR (mitral valve repair) Z98.890    HTN (hypertension) I10    Dyslipidemia E78.5    Diabetes mellitus ([address] E11.9    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 (HCC) I70.249    Ulcer of calf with fat layer exposed, left (HCC) L97.222    Decreased dorsalis pedis pulse R09.89    Hemiparesis of left nondominant side as late effect of cerebrovascular disease ([address] I69.954    Cannot walk R26.2    Squamous cell carcinoma of lower leg, left C44.729    Dysphagia R13.10    Dementia ([facility] F03.90    Hypothyroidism E03.9     HYSTERECTOMY      age 43    TONSILLECTOMY      VASCULAR SURGERY         Social History:    Social History     Tobacco Use    Smoking status: Former Smoker     Packs/day: 0.50     Quit date: 1970     Years since quittin.0    Smokeless tobacco: Never Used   Substance Use Topics    Alcohol use: No                                Counseling given: Not Answered      Vital Signs (Current):   Vitals:    22 0745 22 2015 22 0145 22 0815   BP: (!) 149/74 (!) 150/80  134/62   Pulse: 84 95  (!) 107   Resp: 16   16   Temp: 98 °F (36.7 °C) 98.4 °F (36.9 °C)  98.5 °F (36.9 °C)   TempSrc: Oral Oral  Oral   SpO2: 99% 97%  98%   Weight:   148 lb (67.1 kg)    Height:                                                  BP Readings from Last 3 Encounters:   22 134/62   22 137/66   21 (!) 156/60       NPO Status:  GREATER THAN 8 HOURS                                                                               BMI:   Wt Readings from Last 3 Encounters:   22 148 lb (67.1 kg)   21 118 lb (53.5 kg)   21 118 lb (53.5 kg)     Body mass index is 28.9 kg/m². CBC:   Lab Results   Component Value Date    WBC 8.3 2022    RBC 3.50 2022    HGB 11.0 2022    HCT 33.5 2022    MCV 95.7 2022    RDW 13.8 2022     2022       CMP:   Lab Results   Component Value Date     2022    K 3.1 2022    K 3.6 2022     2022    CO2 25 2022    BUN 5 2022    CREATININE 0.5 2022    GFRAA >60 2022    LABGLOM >60 2022    GLUCOSE 99 2022    GLUCOSE 130 2012    PROT 6.1 2022    CALCIUM 9.0 2022    BILITOT 0.7 2022    ALKPHOS 126 2022    AST 20 2022    ALT 13 2022       POC Tests: No results for input(s): POCGLU, POCNA, POCK, POCCL, POCBUN, POCHEMO, POCHCT in the last 72 hours.     Coags:   Lab Results   Component Value Date    PROTIME 13.1 06/04/2012    INR 1.2 06/04/2012    APTT 31.5 06/04/2012       HCG (If Applicable): No results found for: PREGTESTUR, PREGSERUM, HCG, HCGQUANT     ABGs: No results found for: PHART, PO2ART, ILZ3ZMU, DVO1EDL, BEART, U7FTKHQA     Type & Screen (If Applicable):  No results found for: LABABO, LABRH    Drug/Infectious Status (If Applicable):  No results found for: HIV, HEPCAB    COVID-19 Screening (If Applicable): No results found for: COVID19        Anesthesia Evaluation  Patient summary reviewed no history of anesthetic complications:   Airway: Mallampati: II  TM distance: >3 FB   Neck ROM: full  Mouth opening: > = 3 FB   Dental:          Pulmonary: breath sounds clear to auscultation  (+) asthma:                           ROS comment: Former Smoker   Cardiovascular:    (+) hypertension:, valvular problems/murmurs (AVR, MVR):, past MI: > 6 months, CAD: obstructive, CABG/stent:, hyperlipidemia        Rhythm: regular  Rate: normal                    Neuro/Psych:   (+) CVA (Hemiparesis of left nondominant side as late effect of cerebrovascular disease ([facility] [address])): residual symptoms, depression/anxiety dementia   (-) TIA and psychiatric history           GI/Hepatic/Renal:   (+) GERD:,          ROS comment: Dysphagia. Endo/Other:    (+) DiabetesType II DM, using insulin, hypothyroidism, blood dyscrasia: anemia:., .                 Abdominal:             Vascular:   + PVD, aortic or cerebral, . Other Findings:           Anesthesia Plan      MAC     ASA 4       Induction: intravenous. MIPS: Postoperative opioids intended and Prophylactic antiemetics administered. Anesthetic plan and risks discussed with patient. Plan discussed with CRNA. Chart reviewed . Patient assessed before induction. I agree with the above note.   YOLIS Robin - CRNA        Aidan Dela Cruz MD   6/26/2022      Patient will need to be re-evaluated prior to surgery by DOS anesthesiologist.    Aidan Dela Cruz MD 6/26/2022        5:49 PM     DOS STAFF ADDENDUM:    Patient seen and chart reviewed. No interval change in history or exam.   Anesthesia plan discussed, risk/benefits addressed. Patient's concerns and questions answered.      Zoe Crawford DO  June 27, 2022  3:14 PM

## 2022-06-26 NOTE — PROGRESS NOTES
[facility] Progress Note    Admitting Date and Time: 6/23/2022  9:37 PM  Admit Dx: Dysphagia [R13.10]    Subjective:  Patient is being followed for Dysphagia [R13.10]       Patient seen resting in bed wrapped up in blankets in no acute distress  Reporting she still feels \" cold\"  Denies pain currently  Has been on clear liquids- reporting she is hungry      ROS: denies fever, chills, cp, sob, n/v, HA unless stated above.  magnesium sulfate  2,000 mg IntraVENous Once    sodium chloride flush  5-40 mL IntraVENous 2 times per day    enoxaparin  40 mg SubCUTAneous Daily    [START ON 6/29/2022] levothyroxine  37.5 mcg IntraVENous Daily    insulin lispro  0-6 Units SubCUTAneous TID WC    insulin lispro  0-3 Units SubCUTAneous Nightly    pantoprazole (PROTONIX) 40 mg injection  40 mg IntraVENous Daily     morphine, 2 mg, Q4H PRN  acetaminophen, 650 mg, Q6H PRN  sodium chloride flush, 5-40 mL, PRN  sodium chloride, , PRN  ondansetron, 4 mg, Q8H PRN   Or  ondansetron, 4 mg, Q6H PRN  polyethylene glycol, 17 g, Daily PRN  acetaminophen, 650 mg, Q6H PRN  metoprolol, 5 mg, Q6H PRN  hydrALAZINE, 10 mg, Q6H PRN  glucose, 4 tablet, PRN  dextrose bolus, 125 mL, PRN   Or  dextrose bolus, 250 mL, PRN  glucagon (rDNA), 1 mg, PRN  dextrose, 100 mL/hr, PRN         Objective:    /62   Pulse (!) 107   Temp 98.5 °F (36.9 °C) (Oral)   Resp 16   Ht 5' (1.524 m)   Wt 148 lb (67.1 kg)   SpO2 98%   BMI 28.90 kg/m²   General Appearance: sleeping- awakens easily. Tired.  Oriented   Skin: warm and dry  Head: normocephalic and atraumatic  Neck: neck supple and non tender without mass   Pulmonary/Chest: clear to auscultation bilaterally-  Cardiovascular: normal rate, normal S1 and S2 and no carotid bruits  Abdomen: soft, non-tender, non-distended, normal bowel sounds  Extremities: no cyanosis, no clubbing and no edema, muscle atrophy of lower legs, left hand contracted   Neurologic: speech normal Recent Labs     06/23/22  2222 06/24/22  0946 06/26/22  0534    136 137   K 3.9 3.6 3.1*    102 100   CO2 25 22 25   BUN 11 10 5*   CREATININE 0.5 0.5 0.5   GLUCOSE 119* 110* 99   CALCIUM 8.5* 8.8 9.0       Recent Labs     06/23/22  2222 06/25/22  0451 06/26/22  0534   WBC 8.1 8.0 8.3   RBC 3.12* 3.40* 3.50   HGB 9.8* 10.6* 11.0*   HCT 29.7* 32.5* 33.5*   MCV 95.2 95.6 95.7   MCH 31.4 31.2 31.4   MCHC 33.0 32.6 32.8   RDW 13.2 13.4 13.8    366 390   MPV 10.3 11.0 10.7           Assessment:    Principal Problem:    Dysphagia  Active Problems:    Anemia    CAD (coronary artery disease)    HTN (hypertension)    Diabetes mellitus ([facility]  Resolved Problems:    * No resolved hospital problems. *      Plan:  1. Dysphagia: patient was sent in from nursing home to ER due to concerns for dysphagia. Pt reporting she felt like \" food was getting stuck. \" associated belching. Pt has been following with GI outpt. Concern that she was a high risk for aspiration and plan was for admission and EGD with possible botox/ possible need for peg. . She had an abnormal UGI outpt on 6/13-  Abnormal esophageal motility with combined findings of diffuse esophageal spasm and achalasia type pattern. Speech therapy was consulted here and feel that pt should be able to tolerate thin liquids. Easy to chew diet currently. Currently on clear liquids. Plan is for endoscopy early this week Monday / Tuesday. Clear liquids in mean time per GI. on IVF     2. HTN: pt on norvasc 2.5, coreg 3.125 BID, cozaar 25 daily. Due to current swallowing issue- pills were placed on hold and changed to IV. Currenlty on lopressor 5mg q6 prn     3. DM: check hga1c 4.5- continue insulin ss     4. Thyroid disease: continue synthroid IV for now      5. Acute on chronic anemia: anemia panel reviewed.      6. Deconditioning: pt resides at [facility]- noted muscle atrophy of lower ext. Non ambulatory.       7. Chronic pain- on ultram prn at rehab    8. Hypokalemia/ hypomagnesemia: supplement       ADDENDUM; called by nursing that pt c/o pain. When seen earlier pt denied pain. Nursing reporting pain in left arm/ hand/ stomach and back. Reporting that pt takes ultram several times prn at rehab. Pt having issues with pills currently due to dysphagia and pills on hold. Will order low dose Iv morphine prn for pain with plans to resume ultram after EGD.            Dispo: social work following -pt from SNF/ [place]- with plans to return there. Pt is a bed hold- no need for precert to return     NOTE: This report was transcribed using voice recognition software. Every effort was made to ensure accuracy; however, inadvertent computerized transcription errors may be present.   Electronically signed by LILI De La O on 6/26/2022 at 12:46 PM

## 2022-06-27 LAB
ANION GAP SERPL CALCULATED.3IONS-SCNC: 8 MMOL/L (ref 7–16)
BUN BLDV-MCNC: 5 MG/DL (ref 6–23)
CALCIUM SERPL-MCNC: 8.7 MG/DL (ref 8.6–10.2)
CHLORIDE BLD-SCNC: 102 MMOL/L (ref 98–107)
CO2: 29 MMOL/L (ref 22–29)
CREAT SERPL-MCNC: 0.6 MG/DL (ref 0.5–1)
GFR AFRICAN AMERICAN: >60
GFR NON-AFRICAN AMERICAN: >60 ML/MIN/1.73
GLUCOSE BLD-MCNC: 111 MG/DL (ref 74–99)
MAGNESIUM: 1.9 MG/DL (ref 1.6–2.6)
METER GLUCOSE: 101 MG/DL (ref 74–99)
METER GLUCOSE: 107 MG/DL (ref 74–99)
METER GLUCOSE: 117 MG/DL (ref 74–99)
METER GLUCOSE: 89 MG/DL (ref 74–99)
POTASSIUM SERPL-SCNC: 3.4 MMOL/L (ref 3.5–5)
SODIUM BLD-SCNC: 139 MMOL/L (ref 132–146)

## 2022-06-27 PROCEDURE — 6360000002 HC RX W HCPCS: Performed by: NURSE PRACTITIONER

## 2022-06-27 PROCEDURE — 99232 SBSQ HOSP IP/OBS MODERATE 35: CPT | Performed by: INTERNAL MEDICINE

## 2022-06-27 PROCEDURE — C9113 INJ PANTOPRAZOLE SODIUM, VIA: HCPCS | Performed by: NURSE PRACTITIONER

## 2022-06-27 PROCEDURE — APPSS30 APP SPLIT SHARED TIME 16-30 MINUTES: Performed by: NURSE PRACTITIONER

## 2022-06-27 PROCEDURE — 6360000002 HC RX W HCPCS: Performed by: HOSPITALIST

## 2022-06-27 PROCEDURE — 2709999900 HC NON-CHARGEABLE SUPPLY: Performed by: INTERNAL MEDICINE

## 2022-06-27 PROCEDURE — C1726 CATH, BAL DIL, NON-VASCULAR: HCPCS | Performed by: INTERNAL MEDICINE

## 2022-06-27 PROCEDURE — 2580000003 HC RX 258: Performed by: NURSE PRACTITIONER

## 2022-06-27 PROCEDURE — 2580000003 HC RX 258: Performed by: NURSE ANESTHETIST, CERTIFIED REGISTERED

## 2022-06-27 PROCEDURE — 3700000001 HC ADD 15 MINUTES (ANESTHESIA): Performed by: INTERNAL MEDICINE

## 2022-06-27 PROCEDURE — 6360000002 HC RX W HCPCS: Performed by: NURSE ANESTHETIST, CERTIFIED REGISTERED

## 2022-06-27 PROCEDURE — 0D758ZZ DILATION OF ESOPHAGUS, VIA NATURAL OR ARTIFICIAL OPENING ENDOSCOPIC: ICD-10-PCS | Performed by: INTERNAL MEDICINE

## 2022-06-27 PROCEDURE — 7100000011 HC PHASE II RECOVERY - ADDTL 15 MIN: Performed by: INTERNAL MEDICINE

## 2022-06-27 PROCEDURE — 80048 BASIC METABOLIC PNL TOTAL CA: CPT

## 2022-06-27 PROCEDURE — 7100000010 HC PHASE II RECOVERY - FIRST 15 MIN: Performed by: INTERNAL MEDICINE

## 2022-06-27 PROCEDURE — 36415 COLL VENOUS BLD VENIPUNCTURE: CPT

## 2022-06-27 PROCEDURE — 6360000002 HC RX W HCPCS: Performed by: INTERNAL MEDICINE

## 2022-06-27 PROCEDURE — 3609017700 HC EGD DILATION GASTRIC/DUODENAL STRICTURE: Performed by: INTERNAL MEDICINE

## 2022-06-27 PROCEDURE — 83735 ASSAY OF MAGNESIUM: CPT

## 2022-06-27 PROCEDURE — 2580000003 HC RX 258: Performed by: INTERNAL MEDICINE

## 2022-06-27 PROCEDURE — 1200000000 HC SEMI PRIVATE

## 2022-06-27 PROCEDURE — 3700000000 HC ANESTHESIA ATTENDED CARE: Performed by: INTERNAL MEDICINE

## 2022-06-27 PROCEDURE — 82962 GLUCOSE BLOOD TEST: CPT

## 2022-06-27 RX ORDER — SODIUM CHLORIDE 9 MG/ML
INJECTION, SOLUTION INTRAVENOUS CONTINUOUS PRN
Status: DISCONTINUED | OUTPATIENT
Start: 2022-06-27 | End: 2022-06-27 | Stop reason: SDUPTHER

## 2022-06-27 RX ORDER — PROPOFOL 10 MG/ML
INJECTION, EMULSION INTRAVENOUS PRN
Status: DISCONTINUED | OUTPATIENT
Start: 2022-06-27 | End: 2022-06-27 | Stop reason: SDUPTHER

## 2022-06-27 RX ORDER — POTASSIUM CHLORIDE 7.45 MG/ML
10 INJECTION INTRAVENOUS
Status: COMPLETED | OUTPATIENT
Start: 2022-06-27 | End: 2022-06-27

## 2022-06-27 RX ADMIN — PROPOFOL 120 MG: 10 INJECTION, EMULSION INTRAVENOUS at 15:45

## 2022-06-27 RX ADMIN — MORPHINE SULFATE 2 MG: 2 INJECTION, SOLUTION INTRAMUSCULAR; INTRAVENOUS at 11:42

## 2022-06-27 RX ADMIN — SODIUM CHLORIDE: 9 INJECTION, SOLUTION INTRAVENOUS at 15:38

## 2022-06-27 RX ADMIN — SODIUM CHLORIDE, POTASSIUM CHLORIDE, SODIUM LACTATE AND CALCIUM CHLORIDE: 600; 310; 30; 20 INJECTION, SOLUTION INTRAVENOUS at 21:38

## 2022-06-27 RX ADMIN — SODIUM CHLORIDE, PRESERVATIVE FREE 40 MG: 5 INJECTION INTRAVENOUS at 08:47

## 2022-06-27 RX ADMIN — ONDANSETRON 4 MG: 2 INJECTION INTRAMUSCULAR; INTRAVENOUS at 11:29

## 2022-06-27 RX ADMIN — POTASSIUM CHLORIDE 10 MEQ: 7.46 INJECTION, SOLUTION INTRAVENOUS at 08:50

## 2022-06-27 RX ADMIN — HYDRALAZINE HYDROCHLORIDE 10 MG: 20 INJECTION INTRAMUSCULAR; INTRAVENOUS at 16:36

## 2022-06-27 RX ADMIN — SODIUM CHLORIDE, POTASSIUM CHLORIDE, SODIUM LACTATE AND CALCIUM CHLORIDE: 600; 310; 30; 20 INJECTION, SOLUTION INTRAVENOUS at 06:45

## 2022-06-27 RX ADMIN — ENOXAPARIN SODIUM 40 MG: 100 INJECTION SUBCUTANEOUS at 08:47

## 2022-06-27 RX ADMIN — POTASSIUM CHLORIDE 10 MEQ: 7.46 INJECTION, SOLUTION INTRAVENOUS at 10:03

## 2022-06-27 RX ADMIN — ONABOTULINUMTOXINA 100 UNITS: 100 INJECTION, POWDER, LYOPHILIZED, FOR SOLUTION INTRADERMAL; INTRAMUSCULAR at 17:18

## 2022-06-27 NOTE — PROGRESS NOTES
Spoke with Keiko Chandler. Telephone consent was obtained for EGD and verified per another RN on the unit.

## 2022-06-27 NOTE — OP NOTE
Operative Note      Patient: Shirley Beatty  YOB: 1945  MRN: [id number]    Date of Procedure: 6/27/2022    Pre-Op Diagnosis: Abdominal pain, unspecified abdominal location [R10.9]    Post-Op Diagnosis: hiatal hernia, achalasia       Procedure(s):  EGD ESOPHAGOGASTRODUODENOSCOPY WITH BOTOX  EGD DILATION BALLOON    Surgeon(s):  Queta Jay MD    Assistant:   * No surgical staff found *    Anesthesia: Monitor Anesthesia Care    Estimated Blood Loss (mL): < 5 ml    Complications: None    Specimens:   * No specimens in log *    Implants:  * No implants in log *      Drains:   External Urinary Catheter (Active)   Site Assessment Clean,dry & intact 06/27/22 1139   Placement Replaced 06/27/22 1139   Catheter Care Catheter/Wick replaced 06/27/22 1139   Perineal Care Yes 06/27/22 1139   Suction 40 mmgHg continuous 06/27/22 1139   Urine Color Yellow 06/27/22 1139   Urine Appearance Clear 06/27/22 1139   Urine Odor Malodorous 06/25/22 1054   Output (mL) 200 mL 06/27/22 1139       [REMOVED] Urinary Catheter Chase (Removed)   Catheter Indications Urinary retention (acute or chronic), continuous bladder irrigation or bladder outlet obstruction 06/24/22 0845   Site Assessment Pink 06/24/22 0845   Urine Color Yellow 06/25/22 0037   Urine Appearance Cloudy 06/25/22 0037   Urine Odor Malodorous 06/25/22 0037   Collection Container Standard 06/24/22 0845   Securement Method Tape 06/24/22 0845   Catheter Care Completed Yes 06/24/22 0845   Catheter Best Practices  Drainage tube clipped to bed;Catheter secured to thigh; Bag below bladder;Drainage bag less than half full 06/24/22 0845   Status Draining 06/24/22 0845   Output (mL) 200 mL 06/25/22 0037       Findings: Hiatal hernia; achalasia    Detailed Description of Procedure: EGS, Dilatation of esophagus with CRE baloon, injection of botox  Indication    Sedation  Demerol 150 mg IV  Versed 7 mg IV    Endoscope was advanced easily through mouth to second portion of duodenum      Oropharynx views are limited but grossly normal.    Esophagus:   GEJ at 40 cm. Diaphragmatic hiatus at 42 cm; 2 cm sliding hiatal hernia. Tortuous but normal esophagus with some liquid and mucus. Tight LES with no obvious malignancy or stricturing. Dilated with 15 than 18 mm CRE balloon than injected total of 100 U of botox in equal aliquots in 4 quadrants of LES    Stomach:   Antrum is normal    Gastric body is normal.    Retroflexed views show normal fundus and cardia. Duodenum: Bulb is normal.    Second portion of duodenum is normal.    IMPRESSION AND PLAN: Swallowing study to evaluate contrast passage.   May attempt po diet if better passage of contrast.      Electronically signed by Eladia Rojas MD on 6/27/2022 at 3:55 PM

## 2022-06-27 NOTE — CARE COORDINATION
For EGD today; hgb 11. Pt from [facility] LOC bed hold, no precert needed. Plan is to return; no covid needed. n-17; transport forms on chart. Amada Roe RN,CM.

## 2022-06-27 NOTE — PROGRESS NOTES
[facility] Progress Note    Admitting Date and Time: 6/23/2022  9:37 PM  Admit Dx: Dysphagia [R13.10]    Subjective:  Patient is being followed for Dysphagia [R13.10]     Patient resting in bed in no acute distress  Reporting she is no longer cold  C/o nausea  Per nursing plans for EGD today      ROS: denies fever, chills, cp, sob, n/v, HA unless stated above.  sodium chloride flush  5-40 mL IntraVENous 2 times per day    enoxaparin  40 mg SubCUTAneous Daily    [START ON 6/29/2022] levothyroxine  37.5 mcg IntraVENous Daily    insulin lispro  0-6 Units SubCUTAneous TID WC    insulin lispro  0-3 Units SubCUTAneous Nightly    pantoprazole (PROTONIX) 40 mg injection  40 mg IntraVENous Daily     morphine, 2 mg, Q4H PRN  acetaminophen, 650 mg, Q6H PRN  sodium chloride flush, 5-40 mL, PRN  sodium chloride, , PRN  ondansetron, 4 mg, Q8H PRN   Or  ondansetron, 4 mg, Q6H PRN  polyethylene glycol, 17 g, Daily PRN  acetaminophen, 650 mg, Q6H PRN  metoprolol, 5 mg, Q6H PRN  hydrALAZINE, 10 mg, Q6H PRN  glucose, 4 tablet, PRN  dextrose bolus, 125 mL, PRN   Or  dextrose bolus, 250 mL, PRN  glucagon (rDNA), 1 mg, PRN  dextrose, 100 mL/hr, PRN         Objective:    BP (!) 142/71   Pulse (!) 103   Temp 96.8 °F (36 °C) (Temporal)   Resp 14   Ht 5' (1.524 m)   Wt 151 lb 9.6 oz (68.8 kg)   SpO2 100%   BMI 29.61 kg/m²   General Appearance: sleeping- awakens easily. Tired.  Oriented   Skin: warm and dry  Head: normocephalic and atraumatic  Neck: neck supple and non tender without mass   Pulmonary/Chest: clear to auscultation bilaterally-  Cardiovascular: normal rate, normal S1 and S2 and no carotid bruits  Abdomen: soft, non-tender, non-distended, normal bowel sounds  Extremities: no cyanosis, no clubbing and no edema, muscle atrophy of lower legs, left hand contracted   Neurologic: speech normal         Recent Labs     06/24/22  0946 06/26/22  0534 06/27/22  0516    137 139   K 3.6 3.1* 3.4*  100 102   CO2 22 25 29   BUN 10 5* 5*   CREATININE 0.5 0.5 0.6   GLUCOSE 110* 99 111*   CALCIUM 8.8 9.0 8.7       Recent Labs     06/25/22  0451 06/26/22  0534   WBC 8.0 8.3   RBC 3.40* 3.50   HGB 10.6* 11.0*   HCT 32.5* 33.5*   MCV 95.6 95.7   MCH 31.2 31.4   MCHC 32.6 32.8   RDW 13.4 13.8    390   MPV 11.0 10.7       Radiology:    Assessment:    Principal Problem:    Dysphagia  Active Problems:    Anemia    Hypomagnesemia    CAD (coronary artery disease)    HTN (hypertension)    Diabetes mellitus ([facility] [address]  Resolved Problems:    * No resolved hospital problems. *      Plan:  1. Dysphagia: patient was sent in from nursing home to ER due to concerns for dysphagia. Pt reporting she felt like \" food was getting stuck. \" associated belching. Pt has been following with GI outpt. Concern that she was a high risk for aspiration and plan was for admission and EGD with possible botox/ possible need for peg. . She had an abnormal UGI outpt on 6/13-  Abnormal esophageal motility with combined findings of diffuse esophageal spasm and achalasia type pattern. Speech therapy was consulted here and feel that pt should be able to tolerate thin liquids. Easy to chew diet currently. Currently on clear liquids. Plan is for endoscopy early this week Monday / Tuesday. Clear liquids in mean time per GI. on IVF. C/o nausea this am- prn antiemetics.      2. HTN: pt on norvasc 2.5, coreg 3.125 BID, cozaar 25 daily. Due to current swallowing issue- pills were placed on hold and changed to IV. Currenlty on lopressor 5mg q6 prn     3. DM: check hga1c 4.5- continue insulin ss     4. Thyroid disease: continue synthroid IV for now      5. Acute on chronic anemia: anemia panel reviewed.      6. Deconditioning: pt resides at [facility]- noted muscle atrophy of lower ext. Non ambulatory.       7. Chronic pain- on ultram prn at rehab- on IV morphine prn as unable to take pills. Plans to resume ultram soon     8.  Hypokalemia/ hypomagnesemia: supplement              Dispo: social work following -pt from [facility]- with plans to return there. Pt is a bed hold- no need for precert to return       NOTE: This report was transcribed using voice recognition software. Every effort was made to ensure accuracy; however, inadvertent computerized transcription errors may be present.   Electronically signed by LILI Nguyễn on 6/27/2022 at 8:42 AM

## 2022-06-27 NOTE — ANESTHESIA POSTPROCEDURE EVALUATION
Department of Anesthesiology  Postprocedure Note    Patient: Dolores Gary  MRN: [id number]  [place]: 1945  Date of evaluation: 6/27/2022      Procedure Summary     Date: 06/27/22 Room / Location: [facility] / [facility]    Anesthesia Start: 5451 Anesthesia Stop: 1600    Procedures:       EGD ESOPHAGOGASTRODUODENOSCOPY WITH BOTOX (N/A )      EGD DILATION BALLOON      Procedure Not Yet Scheduled Diagnosis:       Abdominal pain, unspecified abdominal location      (Abdominal pain, unspecified abdominal location [R10.9])    Surgeons: Raza Chew MD Responsible Provider: Stella Connor DO    Anesthesia Type: MAC ASA Status: 4          Anesthesia Type: No value filed.     Shireen Phase I:      Shireen Phase II: Shireen Score: 9      Anesthesia Post Evaluation    Patient location during evaluation: PACU  Patient participation: complete - patient participated  Level of consciousness: awake and alert  Airway patency: patent  Nausea & Vomiting: no nausea and no vomiting  Complications: no  Cardiovascular status: hemodynamically stable  Respiratory status: acceptable  Hydration status: euvolemic

## 2022-06-27 NOTE — PROGRESS NOTES
Report called to floor RN    Patient medicated for high bp with PRN hydralazine.    Tooth contained in specimen cup and returned to floor with patient

## 2022-06-28 ENCOUNTER — APPOINTMENT (OUTPATIENT)
Dept: GENERAL RADIOLOGY | Age: 77
DRG: 392 | End: 2022-06-28
Payer: COMMERCIAL

## 2022-06-28 LAB
ANION GAP SERPL CALCULATED.3IONS-SCNC: 12 MMOL/L (ref 7–16)
BASOPHILS ABSOLUTE: 0.08 E9/L (ref 0–0.2)
BASOPHILS RELATIVE PERCENT: 1 % (ref 0–2)
BUN BLDV-MCNC: 5 MG/DL (ref 6–23)
CALCIUM SERPL-MCNC: 8.5 MG/DL (ref 8.6–10.2)
CHLORIDE BLD-SCNC: 102 MMOL/L (ref 98–107)
CO2: 25 MMOL/L (ref 22–29)
CREAT SERPL-MCNC: 0.5 MG/DL (ref 0.5–1)
EOSINOPHILS ABSOLUTE: 0.24 E9/L (ref 0.05–0.5)
EOSINOPHILS RELATIVE PERCENT: 3 % (ref 0–6)
GFR AFRICAN AMERICAN: >60
GFR NON-AFRICAN AMERICAN: >60 ML/MIN/1.73
GLUCOSE BLD-MCNC: 99 MG/DL (ref 74–99)
HCT VFR BLD CALC: 32.6 % (ref 34–48)
HCT VFR BLD CALC: 32.8 % (ref 34–48)
HEMOGLOBIN: 10.3 G/DL (ref 11.5–15.5)
HEMOGLOBIN: 10.5 G/DL (ref 11.5–15.5)
IMMATURE GRANULOCYTES #: 0.04 E9/L
IMMATURE GRANULOCYTES %: 0.5 % (ref 0–5)
LYMPHOCYTES ABSOLUTE: 2.5 E9/L (ref 1.5–4)
LYMPHOCYTES RELATIVE PERCENT: 30.8 % (ref 20–42)
MCH RBC QN AUTO: 31.2 PG (ref 26–35)
MCH RBC QN AUTO: 31.7 PG (ref 26–35)
MCHC RBC AUTO-ENTMCNC: 31.6 % (ref 32–34.5)
MCHC RBC AUTO-ENTMCNC: 32 % (ref 32–34.5)
MCV RBC AUTO: 98.8 FL (ref 80–99.9)
MCV RBC AUTO: 99.1 FL (ref 80–99.9)
METER GLUCOSE: 101 MG/DL (ref 74–99)
METER GLUCOSE: 114 MG/DL (ref 74–99)
METER GLUCOSE: 164 MG/DL (ref 74–99)
METER GLUCOSE: 84 MG/DL (ref 74–99)
MONOCYTES ABSOLUTE: 0.56 E9/L (ref 0.1–0.95)
MONOCYTES RELATIVE PERCENT: 6.9 % (ref 2–12)
NEUTROPHILS ABSOLUTE: 4.7 E9/L (ref 1.8–7.3)
NEUTROPHILS RELATIVE PERCENT: 57.8 % (ref 43–80)
PDW BLD-RTO: 14.1 FL (ref 11.5–15)
PDW BLD-RTO: 14.3 FL (ref 11.5–15)
PHOSPHORUS: 2.7 MG/DL (ref 2.5–4.5)
PLATELET # BLD: 366 E9/L (ref 130–450)
PLATELET # BLD: 367 E9/L (ref 130–450)
PMV BLD AUTO: 11.1 FL (ref 7–12)
PMV BLD AUTO: 11.2 FL (ref 7–12)
POTASSIUM SERPL-SCNC: 3.4 MMOL/L (ref 3.5–5)
RBC # BLD: 3.3 E12/L (ref 3.5–5.5)
RBC # BLD: 3.31 E12/L (ref 3.5–5.5)
SODIUM BLD-SCNC: 139 MMOL/L (ref 132–146)
WBC # BLD: 7.8 E9/L (ref 4.5–11.5)
WBC # BLD: 8.1 E9/L (ref 4.5–11.5)

## 2022-06-28 PROCEDURE — 85025 COMPLETE CBC W/AUTO DIFF WBC: CPT

## 2022-06-28 PROCEDURE — 6360000002 HC RX W HCPCS: Performed by: NURSE PRACTITIONER

## 2022-06-28 PROCEDURE — 36415 COLL VENOUS BLD VENIPUNCTURE: CPT

## 2022-06-28 PROCEDURE — 2580000003 HC RX 258: Performed by: INTERNAL MEDICINE

## 2022-06-28 PROCEDURE — C9113 INJ PANTOPRAZOLE SODIUM, VIA: HCPCS | Performed by: NURSE PRACTITIONER

## 2022-06-28 PROCEDURE — 82962 GLUCOSE BLOOD TEST: CPT

## 2022-06-28 PROCEDURE — 74240 X-RAY XM UPR GI TRC 1CNTRST: CPT

## 2022-06-28 PROCEDURE — 1200000000 HC SEMI PRIVATE

## 2022-06-28 PROCEDURE — 80048 BASIC METABOLIC PNL TOTAL CA: CPT

## 2022-06-28 PROCEDURE — 6370000000 HC RX 637 (ALT 250 FOR IP): Performed by: NURSE PRACTITIONER

## 2022-06-28 PROCEDURE — APPSS30 APP SPLIT SHARED TIME 16-30 MINUTES: Performed by: NURSE PRACTITIONER

## 2022-06-28 PROCEDURE — 84100 ASSAY OF PHOSPHORUS: CPT

## 2022-06-28 PROCEDURE — 99232 SBSQ HOSP IP/OBS MODERATE 35: CPT | Performed by: INTERNAL MEDICINE

## 2022-06-28 PROCEDURE — 6370000000 HC RX 637 (ALT 250 FOR IP): Performed by: INTERNAL MEDICINE

## 2022-06-28 PROCEDURE — 82272 OCCULT BLD FECES 1-3 TESTS: CPT

## 2022-06-28 PROCEDURE — A4216 STERILE WATER/SALINE, 10 ML: HCPCS | Performed by: NURSE PRACTITIONER

## 2022-06-28 PROCEDURE — 2500000003 HC RX 250 WO HCPCS: Performed by: RADIOLOGY

## 2022-06-28 PROCEDURE — 2580000003 HC RX 258: Performed by: NURSE PRACTITIONER

## 2022-06-28 PROCEDURE — 85027 COMPLETE CBC AUTOMATED: CPT

## 2022-06-28 RX ORDER — LOSARTAN POTASSIUM 25 MG/1
25 TABLET ORAL DAILY
Status: DISCONTINUED | OUTPATIENT
Start: 2022-06-28 | End: 2022-06-30 | Stop reason: HOSPADM

## 2022-06-28 RX ORDER — CARVEDILOL 3.12 MG/1
3.12 TABLET ORAL 2 TIMES DAILY WITH MEALS
Status: DISCONTINUED | OUTPATIENT
Start: 2022-06-28 | End: 2022-06-30 | Stop reason: HOSPADM

## 2022-06-28 RX ORDER — ROSUVASTATIN CALCIUM 10 MG/1
10 TABLET, COATED ORAL DAILY
Status: DISCONTINUED | OUTPATIENT
Start: 2022-06-28 | End: 2022-06-30 | Stop reason: HOSPADM

## 2022-06-28 RX ORDER — DONEPEZIL HYDROCHLORIDE 5 MG/1
5 TABLET, FILM COATED ORAL NIGHTLY
Status: DISCONTINUED | OUTPATIENT
Start: 2022-06-28 | End: 2022-06-30 | Stop reason: HOSPADM

## 2022-06-28 RX ORDER — SERTRALINE HYDROCHLORIDE 100 MG/1
100 TABLET, FILM COATED ORAL DAILY
Status: DISCONTINUED | OUTPATIENT
Start: 2022-06-28 | End: 2022-06-30 | Stop reason: HOSPADM

## 2022-06-28 RX ORDER — TRAMADOL HYDROCHLORIDE 50 MG/1
50 TABLET ORAL EVERY 6 HOURS PRN
Status: DISCONTINUED | OUTPATIENT
Start: 2022-06-28 | End: 2022-06-30 | Stop reason: HOSPADM

## 2022-06-28 RX ORDER — AMLODIPINE BESYLATE 2.5 MG/1
2.5 TABLET ORAL DAILY
Status: DISCONTINUED | OUTPATIENT
Start: 2022-06-28 | End: 2022-06-30 | Stop reason: HOSPADM

## 2022-06-28 RX ORDER — LEVOTHYROXINE SODIUM 0.05 MG/1
50 TABLET ORAL DAILY
Status: DISCONTINUED | OUTPATIENT
Start: 2022-06-28 | End: 2022-06-30 | Stop reason: HOSPADM

## 2022-06-28 RX ADMIN — INSULIN LISPRO 1 UNITS: 100 INJECTION, SOLUTION INTRAVENOUS; SUBCUTANEOUS at 21:09

## 2022-06-28 RX ADMIN — ONDANSETRON 4 MG: 4 TABLET, ORALLY DISINTEGRATING ORAL at 18:50

## 2022-06-28 RX ADMIN — SODIUM CHLORIDE, PRESERVATIVE FREE 40 MG: 5 INJECTION INTRAVENOUS at 08:12

## 2022-06-28 RX ADMIN — CARVEDILOL 3.12 MG: 3.12 TABLET, FILM COATED ORAL at 18:04

## 2022-06-28 RX ADMIN — SERTRALINE 100 MG: 100 TABLET, FILM COATED ORAL at 18:04

## 2022-06-28 RX ADMIN — MICONAZOLE NITRATE: 2 OINTMENT TOPICAL at 21:26

## 2022-06-28 RX ADMIN — AMLODIPINE BESYLATE 2.5 MG: 2.5 TABLET ORAL at 18:04

## 2022-06-28 RX ADMIN — ENOXAPARIN SODIUM 40 MG: 100 INJECTION SUBCUTANEOUS at 08:11

## 2022-06-28 RX ADMIN — MORPHINE SULFATE 2 MG: 2 INJECTION, SOLUTION INTRAMUSCULAR; INTRAVENOUS at 21:04

## 2022-06-28 RX ADMIN — DONEPEZIL HYDROCHLORIDE 5 MG: 5 TABLET, FILM COATED ORAL at 21:05

## 2022-06-28 RX ADMIN — LEVOTHYROXINE SODIUM 50 MCG: 0.05 TABLET ORAL at 18:04

## 2022-06-28 RX ADMIN — LOSARTAN POTASSIUM 25 MG: 25 TABLET, FILM COATED ORAL at 18:04

## 2022-06-28 RX ADMIN — SODIUM CHLORIDE, POTASSIUM CHLORIDE, SODIUM LACTATE AND CALCIUM CHLORIDE: 600; 310; 30; 20 INJECTION, SOLUTION INTRAVENOUS at 12:29

## 2022-06-28 RX ADMIN — BARIUM SULFATE 176 G: 960 POWDER, FOR SUSPENSION ORAL at 10:09

## 2022-06-28 RX ADMIN — MORPHINE SULFATE 2 MG: 2 INJECTION, SOLUTION INTRAMUSCULAR; INTRAVENOUS at 02:57

## 2022-06-28 RX ADMIN — ROSUVASTATIN CALCIUM 10 MG: 10 TABLET, FILM COATED ORAL at 18:04

## 2022-06-28 ASSESSMENT — PAIN DESCRIPTION - LOCATION
LOCATION: BACK

## 2022-06-28 ASSESSMENT — PAIN - FUNCTIONAL ASSESSMENT
PAIN_FUNCTIONAL_ASSESSMENT: PREVENTS OR INTERFERES SOME ACTIVE ACTIVITIES AND ADLS

## 2022-06-28 ASSESSMENT — PAIN SCALES - GENERAL
PAINLEVEL_OUTOF10: 3
PAINLEVEL_OUTOF10: 9
PAINLEVEL_OUTOF10: 8
PAINLEVEL_OUTOF10: 8
PAINLEVEL_OUTOF10: 0
PAINLEVEL_OUTOF10: 4

## 2022-06-28 ASSESSMENT — PAIN DESCRIPTION - ORIENTATION
ORIENTATION: LOWER

## 2022-06-28 ASSESSMENT — PAIN DESCRIPTION - ONSET: ONSET: ON-GOING

## 2022-06-28 ASSESSMENT — PAIN DESCRIPTION - FREQUENCY: FREQUENCY: INTERMITTENT

## 2022-06-28 ASSESSMENT — PAIN DESCRIPTION - DESCRIPTORS
DESCRIPTORS: ACHING

## 2022-06-28 NOTE — PROGRESS NOTES
PROGRESS NOTE  By Sarah Moreno M.D. [facility]  Dr. Tom Guevara M.D.,  Dr. Yvette Miller M.D.,   Dr. Kelle Ackerman D.O.,  Dr. Nida Dodge M.D.,  Dr. Jc Levy D.O.,  Shayy Villalta D.O. April Knock  65 y.o.  female    SUBJECTIVE:  Planes of epigastric pain. No nausea vomiting. Wife at bedside    OBJECTIVE:    BP (!) 144/64   Pulse 92   Temp 98.4 °F (36.9 °C) (Oral)   Resp 16   Ht 5' (1.524 m)   Wt 161 lb 1 oz (73.1 kg)   SpO2 100%   BMI 31.46 kg/m²     General: ED/ female  HEENT: Anicteric sclera/moist oral mucosa  Neck: Supple/trachea midline  Chest: CTA B  Cor: Regular  Abd.: Soft/NT  Extr.:  No peripheral edema  Skin: Warm and dry    DATA:    UGI series images reviewed/report read-no high-grade stenosis or narrowing noted -reported slightly wider appearance in the distal esophageal sphincter area.        Lab Results   Component Value Date    WBC 8.1 06/28/2022    WBC 7.8 06/28/2022    RBC 3.30 06/28/2022    RBC 3.31 06/28/2022    HGB 10.3 06/28/2022    HGB 10.5 06/28/2022    HCT 32.6 06/28/2022    HCT 32.8 06/28/2022    MCV 98.8 06/28/2022    MCV 99.1 06/28/2022    MCH 31.2 06/28/2022    MCH 31.7 06/28/2022    MCHC 31.6 06/28/2022    MCHC 32.0 06/28/2022    RDW 14.3 06/28/2022    RDW 14.1 06/28/2022     06/28/2022     06/28/2022    MPV 11.2 06/28/2022    MPV 11.1 06/28/2022     Lab Results   Component Value Date     06/28/2022    K 3.4 06/28/2022    K 3.6 06/24/2022     06/28/2022    CO2 25 06/28/2022    BUN 5 06/28/2022    CREATININE 0.5 06/28/2022    CALCIUM 8.5 06/28/2022    PROT 6.1 06/24/2022    LABALBU 2.7 06/24/2022    LABALBU 4.4 06/04/2012    BILITOT 0.7 06/24/2022    ALKPHOS 126 06/24/2022    AST 20 06/24/2022    ALT 13 06/24/2022     No results found for: LIPASE  No results found for: AMYLASE      ASSESSMENT/PLAN:  Patient Active Problem List   Diagnosis    CAD (coronary artery disease)    S/P AVR (aortic valve replacement)    S/P MVR (mitral valve repair)    HTN (hypertension)    Dyslipidemia    Diabetes mellitus ([address]    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 (HCC)    Ulcer of calf with fat layer exposed, left (HCC)    Decreased dorsalis pedis pulse    Hemiparesis of left nondominant side as late effect of cerebrovascular disease (HCC)    Cannot walk    Squamous cell carcinoma of lower leg, left    Dysphagia    Dementia (HCC)    Hypothyroidism    Gastroesophageal reflux disease    Myocardial infarction ([address]    Major depressive disorder    Photophobia    Anemia    Hypomagnesemia     1.  PPJQQTHID  -Abnormal UGI 6/13  -EGD 6/27 with dilatation and Botox injection in the area of the lower esophageal sphincter  -Improved esophagogram  -Start clear liquid diet and advance to full liquid as tolerated  -Recommend upright for meals and 2 to 3 hours after meals  -Speech therapy recommendations for consistency of liquids    2.  Anemia  -Acute on chronic  -No evidence of overt bleed  -Stable H&H  -No significant iron deficiency  -Pending FOBT  -EGD as above  -Outpatient colonoscopy -TBD    Above has been discussed with patient and her daughter at bedside and all questions answered to their satisfaction. They are agreeable with the plan as delineated. Randy Rolon MD  6/28/2022  3:14 PM    NOTE:  This report was transcribed using voice recognition software. Every effort was made to ensure accuracy; however, inadvertent computerized transcription errors may be present.

## 2022-06-28 NOTE — FLOWSHEET NOTE
Inpatient Wound Care    Admit Date: 6/23/2022  9:37 PM    Reason for consult:  L shin , sacrum    Significant history:  Admitted with Dysphagia. History of Aortic stenosis, CAD, DM, CABG, HTN. Wound history:  POA    Findings:       06/28/22 1700   Wound 06/24/22 Sacrum   Date First Assessed/Time First Assessed: 06/24/22 0304   Present on Hospital Admission: Yes  Location: Sacrum   Wound Etiology Pressure Stage 1   Wound Length (cm) 5 cm   Wound Width (cm) 4 cm   Wound Surface Area (cm^2) 20 cm^2   Change in Wound Size % (l*w) -02078   Wound Assessment Pink/red   Odor None   Maggie-wound Assessment   (fungal derm)       Impression:  Stage 1 pressure injury coccyx. L shin wound is closed. Interventions in place: Pt is incontinent of urine. Fungal derm noted. Recommend Aloe [place], SOS precautions, low air loss bed. Plan: [facility] [place], low air loss bed, SOS precautions.        Eren Koenig RN 6/28/2022 5:03 PM

## 2022-06-28 NOTE — CARE COORDINATION
For UGI today; s/p egd with botox; plan is to return to [facility], no covid needed. N-17 ; transport forms on chart. Na Membreno.

## 2022-06-28 NOTE — PROGRESS NOTES
[facility] Progress Note    Admitting Date and Time: 6/23/2022  9:37 PM  Admit Dx: Dysphagia [R13.10]    Subjective:  Patient is being followed for Dysphagia [R13.10]     Patient awake and alert- resting in bed in no acute distress  Reporting she is \" starving\"  Denies nausea  UGI series pending  Daughter at bedside      ROS: denies fever, chills, cp, sob, n/v, HA unless stated above.  sodium chloride flush  5-40 mL IntraVENous 2 times per day    enoxaparin  40 mg SubCUTAneous Daily    [START ON 6/29/2022] levothyroxine  37.5 mcg IntraVENous Daily    insulin lispro  0-6 Units SubCUTAneous TID WC    insulin lispro  0-3 Units SubCUTAneous Nightly    pantoprazole (PROTONIX) 40 mg injection  40 mg IntraVENous Daily     morphine, 2 mg, Q4H PRN  acetaminophen, 650 mg, Q6H PRN  sodium chloride flush, 5-40 mL, PRN  sodium chloride, , PRN  ondansetron, 4 mg, Q8H PRN   Or  ondansetron, 4 mg, Q6H PRN  polyethylene glycol, 17 g, Daily PRN  acetaminophen, 650 mg, Q6H PRN  metoprolol, 5 mg, Q6H PRN  hydrALAZINE, 10 mg, Q6H PRN  glucose, 4 tablet, PRN  dextrose bolus, 125 mL, PRN   Or  dextrose bolus, 250 mL, PRN  glucagon (rDNA), 1 mg, PRN  dextrose, 100 mL/hr, PRN         Objective:    BP (!) 165/72   Pulse 90   Temp 98.2 °F (36.8 °C) (Oral)   Resp 16   Ht 5' (1.524 m)   Wt 161 lb 1 oz (73.1 kg)   SpO2 97%   BMI 31.46 kg/m²   General Appearance: sleeping- awakens easily. Tired.  Oriented   Skin: warm and dry  Head: normocephalic and atraumatic  Neck: neck supple and non tender without mass   Pulmonary/Chest: clear to auscultation bilaterally-  Cardiovascular: normal rate, normal S1 and S2 and no carotid bruits  Abdomen: soft, non-tender, non-distended, normal bowel sounds  Extremities: no cyanosis, no clubbing and no edema, muscle atrophy of lower legs, left hand contracted   Neurologic: speech normal         Recent Labs     06/26/22  0534 06/27/22  0516    139   K 3.1* 3.4*    102   CO2 25 29   BUN 5* 5*   CREATININE 0.5 0.6   GLUCOSE 99 111*   CALCIUM 9.0 8.7       Recent Labs     06/26/22  0534   WBC 8.3   RBC 3.50   HGB 11.0*   HCT 33.5*   MCV 95.7   MCH 31.4   MCHC 32.8   RDW 13.8      MPV 10.7           Assessment:    Principal Problem:    Dysphagia  Active Problems:    Anemia    Hypomagnesemia    CAD (coronary artery disease)    HTN (hypertension)    Diabetes mellitus ([facility]  Resolved Problems:    * No resolved hospital problems. *      Plan:  1. Dysphagia: patient was sent in from nursing home to ER due to concerns for dysphagia. Pt reporting she felt like \" food was getting stuck. \" associated belching. Pt has been following with GI outpt. Concern that she was a high risk for aspiration and plan was for admission and EGD with possible botox/ possible need for peg. . She had an abnormal UGI outpt on 6/13-  Abnormal esophageal motility with combined findings of diffuse esophageal spasm and achalasia type pattern. Speech therapy was consulted here and feel that pt should be able to tolerate thin liquids. Easy to chew diet currently. S/p EGD on 6/27 with dilation of esophagus and botox injection. NPO with plans to check UGI . Pt very hungry and wanting to eat-diet when ok by GI. Plans to resume po meds when ok with GI.     2. HTN: pt on norvasc 2.5, coreg 3.125 BID, cozaar 25 daily. Due to current swallowing issue- pills were placed on hold and changed to IV. Currenlty on lopressor 5mg q6 prn     3. DM: check hga1c 4.5- continue insulin ss     4. Thyroid disease: continue synthroid IV for now      5. Acute on chronic anemia: anemia panel reviewed.      6. Deconditioning: pt resides at [facility]- noted muscle atrophy of lower ext. Non ambulatory.       7. Chronic pain- on ultram prn at rehab- on IV morphine prn as unable to take pills. Plans to resume ultram soon     8. Hypokalemia/ hypomagnesemia: supplement           NOTE: This report was transcribed using voice recognition software. Every effort was made to ensure accuracy; however, inadvertent computerized transcription errors may be present.   Electronically signed by LILI Rodarte on 6/28/2022 at 8:55 AM

## 2022-06-28 NOTE — PROGRESS NOTES
Attempted ongoing Speech-Language Pathology intervention for dysphagia management. Pt unavailable at this time due to:  [] HOLD per RN/ medical staff d/t medical status   [] Off unit for testing/ procedure    [] With medical staff   [] Declined intervention  [] Sleeping/ Lethargic   [x] Other: Per chart review, patient is currently NPO per GI recommendation. SLP to continue previously established POC and re-attempt as able.     Ela Rodas M.S., [address] Pathologist  [phone]

## 2022-06-29 VITALS
RESPIRATION RATE: 16 BRPM | HEIGHT: 60 IN | BODY MASS INDEX: 23.07 KG/M2 | HEART RATE: 84 BPM | TEMPERATURE: 98.7 F | DIASTOLIC BLOOD PRESSURE: 75 MMHG | WEIGHT: 117.5 LBS | OXYGEN SATURATION: 100 % | SYSTOLIC BLOOD PRESSURE: 148 MMHG

## 2022-06-29 LAB
ANION GAP SERPL CALCULATED.3IONS-SCNC: 10 MMOL/L (ref 7–16)
BASOPHILS ABSOLUTE: 0.08 E9/L (ref 0–0.2)
BASOPHILS RELATIVE PERCENT: 1 % (ref 0–2)
BUN BLDV-MCNC: 5 MG/DL (ref 6–23)
CALCIUM SERPL-MCNC: 8.2 MG/DL (ref 8.6–10.2)
CHLORIDE BLD-SCNC: 100 MMOL/L (ref 98–107)
CO2: 26 MMOL/L (ref 22–29)
CREAT SERPL-MCNC: 0.5 MG/DL (ref 0.5–1)
EOSINOPHILS ABSOLUTE: 0.32 E9/L (ref 0.05–0.5)
EOSINOPHILS RELATIVE PERCENT: 3.8 % (ref 0–6)
GFR AFRICAN AMERICAN: >60
GFR NON-AFRICAN AMERICAN: >60 ML/MIN/1.73
GLUCOSE BLD-MCNC: 94 MG/DL (ref 74–99)
HCT VFR BLD CALC: 29.3 % (ref 34–48)
HEMOGLOBIN: 9.3 G/DL (ref 11.5–15.5)
IMMATURE GRANULOCYTES #: 0.03 E9/L
IMMATURE GRANULOCYTES %: 0.4 % (ref 0–5)
LYMPHOCYTES ABSOLUTE: 2.85 E9/L (ref 1.5–4)
LYMPHOCYTES RELATIVE PERCENT: 34.2 % (ref 20–42)
MAGNESIUM: 1.5 MG/DL (ref 1.6–2.6)
MCH RBC QN AUTO: 31 PG (ref 26–35)
MCHC RBC AUTO-ENTMCNC: 31.7 % (ref 32–34.5)
MCV RBC AUTO: 97.7 FL (ref 80–99.9)
METER GLUCOSE: 143 MG/DL (ref 74–99)
METER GLUCOSE: 154 MG/DL (ref 74–99)
METER GLUCOSE: 201 MG/DL (ref 74–99)
METER GLUCOSE: 98 MG/DL (ref 74–99)
MONOCYTES ABSOLUTE: 0.75 E9/L (ref 0.1–0.95)
MONOCYTES RELATIVE PERCENT: 9 % (ref 2–12)
NEUTROPHILS ABSOLUTE: 4.3 E9/L (ref 1.8–7.3)
NEUTROPHILS RELATIVE PERCENT: 51.6 % (ref 43–80)
OCCULT BLOOD DIAGNOSTIC: NORMAL
PDW BLD-RTO: 14.1 FL (ref 11.5–15)
PLATELET # BLD: 315 E9/L (ref 130–450)
PMV BLD AUTO: 11.1 FL (ref 7–12)
POTASSIUM SERPL-SCNC: 3.1 MMOL/L (ref 3.5–5)
RBC # BLD: 3 E12/L (ref 3.5–5.5)
SODIUM BLD-SCNC: 136 MMOL/L (ref 132–146)
WBC # BLD: 8.3 E9/L (ref 4.5–11.5)

## 2022-06-29 PROCEDURE — APPSS45 APP SPLIT SHARED TIME 31-45 MINUTES: Performed by: NURSE PRACTITIONER

## 2022-06-29 PROCEDURE — 82962 GLUCOSE BLOOD TEST: CPT

## 2022-06-29 PROCEDURE — 92526 ORAL FUNCTION THERAPY: CPT

## 2022-06-29 PROCEDURE — 83735 ASSAY OF MAGNESIUM: CPT

## 2022-06-29 PROCEDURE — 2580000003 HC RX 258: Performed by: INTERNAL MEDICINE

## 2022-06-29 PROCEDURE — 6370000000 HC RX 637 (ALT 250 FOR IP): Performed by: NURSE PRACTITIONER

## 2022-06-29 PROCEDURE — 1200000000 HC SEMI PRIVATE

## 2022-06-29 PROCEDURE — 2580000003 HC RX 258: Performed by: NURSE PRACTITIONER

## 2022-06-29 PROCEDURE — APPSS30 APP SPLIT SHARED TIME 16-30 MINUTES: Performed by: NURSE PRACTITIONER

## 2022-06-29 PROCEDURE — C9113 INJ PANTOPRAZOLE SODIUM, VIA: HCPCS | Performed by: NURSE PRACTITIONER

## 2022-06-29 PROCEDURE — 85025 COMPLETE CBC W/AUTO DIFF WBC: CPT

## 2022-06-29 PROCEDURE — 36415 COLL VENOUS BLD VENIPUNCTURE: CPT

## 2022-06-29 PROCEDURE — 6360000002 HC RX W HCPCS: Performed by: NURSE PRACTITIONER

## 2022-06-29 PROCEDURE — A4216 STERILE WATER/SALINE, 10 ML: HCPCS | Performed by: NURSE PRACTITIONER

## 2022-06-29 PROCEDURE — 80048 BASIC METABOLIC PNL TOTAL CA: CPT

## 2022-06-29 PROCEDURE — 99239 HOSP IP/OBS DSCHRG MGMT >30: CPT | Performed by: INTERNAL MEDICINE

## 2022-06-29 RX ORDER — POTASSIUM BICARBONATE 25 MEQ/1
25 TABLET, EFFERVESCENT ORAL ONCE
Status: DISCONTINUED | OUTPATIENT
Start: 2022-06-29 | End: 2022-06-29 | Stop reason: ALTCHOICE

## 2022-06-29 RX ORDER — MAGNESIUM SULFATE IN WATER 40 MG/ML
2000 INJECTION, SOLUTION INTRAVENOUS ONCE
Status: COMPLETED | OUTPATIENT
Start: 2022-06-29 | End: 2022-06-29

## 2022-06-29 RX ORDER — ACETAMINOPHEN 160 MG/5ML
650 SOLUTION ORAL EVERY 6 HOURS PRN
Qty: 473 ML | Refills: 3 | DISCHARGE
Start: 2022-06-29 | End: 2022-08-24 | Stop reason: DRUGHIGH

## 2022-06-29 RX ORDER — INSULIN LISPRO 100 [IU]/ML
0-6 INJECTION, SOLUTION INTRAVENOUS; SUBCUTANEOUS
DISCHARGE
Start: 2022-06-29 | End: 2022-08-24 | Stop reason: ALTCHOICE

## 2022-06-29 RX ORDER — PANTOPRAZOLE SODIUM 40 MG/1
40 TABLET, DELAYED RELEASE ORAL
Status: DISCONTINUED | OUTPATIENT
Start: 2022-06-30 | End: 2022-06-30 | Stop reason: HOSPADM

## 2022-06-29 RX ORDER — INSULIN LISPRO 100 [IU]/ML
0-3 INJECTION, SOLUTION INTRAVENOUS; SUBCUTANEOUS NIGHTLY
DISCHARGE
Start: 2022-06-29 | End: 2022-08-24 | Stop reason: ALTCHOICE

## 2022-06-29 RX ADMIN — ACETAMINOPHEN ORAL SOLUTION 650 MG: 650 SOLUTION ORAL at 19:48

## 2022-06-29 RX ADMIN — ROSUVASTATIN CALCIUM 10 MG: 10 TABLET, FILM COATED ORAL at 09:30

## 2022-06-29 RX ADMIN — CARVEDILOL 3.12 MG: 3.12 TABLET, FILM COATED ORAL at 17:24

## 2022-06-29 RX ADMIN — AMLODIPINE BESYLATE 2.5 MG: 2.5 TABLET ORAL at 09:30

## 2022-06-29 RX ADMIN — LEVOTHYROXINE SODIUM 50 MCG: 0.05 TABLET ORAL at 09:30

## 2022-06-29 RX ADMIN — CARVEDILOL 3.12 MG: 3.12 TABLET, FILM COATED ORAL at 09:30

## 2022-06-29 RX ADMIN — SODIUM CHLORIDE, POTASSIUM CHLORIDE, SODIUM LACTATE AND CALCIUM CHLORIDE: 600; 310; 30; 20 INJECTION, SOLUTION INTRAVENOUS at 03:10

## 2022-06-29 RX ADMIN — SODIUM CHLORIDE, PRESERVATIVE FREE 40 MG: 5 INJECTION INTRAVENOUS at 09:30

## 2022-06-29 RX ADMIN — MAGNESIUM SULFATE HEPTAHYDRATE 2000 MG: 40 INJECTION, SOLUTION INTRAVENOUS at 09:37

## 2022-06-29 RX ADMIN — DONEPEZIL HYDROCHLORIDE 5 MG: 5 TABLET, FILM COATED ORAL at 21:51

## 2022-06-29 RX ADMIN — SERTRALINE 100 MG: 100 TABLET, FILM COATED ORAL at 09:30

## 2022-06-29 RX ADMIN — LOSARTAN POTASSIUM 25 MG: 25 TABLET, FILM COATED ORAL at 09:30

## 2022-06-29 RX ADMIN — POTASSIUM BICARBONATE 20 MEQ: 782 TABLET, EFFERVESCENT ORAL at 09:36

## 2022-06-29 RX ADMIN — INSULIN LISPRO 2 UNITS: 100 INJECTION, SOLUTION INTRAVENOUS; SUBCUTANEOUS at 12:03

## 2022-06-29 RX ADMIN — ENOXAPARIN SODIUM 40 MG: 100 INJECTION SUBCUTANEOUS at 09:30

## 2022-06-29 RX ADMIN — MICONAZOLE NITRATE: 2 OINTMENT TOPICAL at 09:36

## 2022-06-29 ASSESSMENT — PAIN - FUNCTIONAL ASSESSMENT: PAIN_FUNCTIONAL_ASSESSMENT: PREVENTS OR INTERFERES SOME ACTIVE ACTIVITIES AND ADLS

## 2022-06-29 ASSESSMENT — PAIN SCALES - GENERAL
PAINLEVEL_OUTOF10: 4
PAINLEVEL_OUTOF10: 0

## 2022-06-29 ASSESSMENT — PAIN DESCRIPTION - LOCATION: LOCATION: ARM;ANKLE

## 2022-06-29 ASSESSMENT — PAIN DESCRIPTION - DESCRIPTORS: DESCRIPTORS: ACHING

## 2022-06-29 NOTE — CARE COORDINATION
FLD; await to advance diet. Plan is to return to [facility]; N-[facility] transport forms on chart. No covid or precert need. Reji Burrows.

## 2022-06-29 NOTE — DISCHARGE SUMMARY
[facility] Physician Discharge Summary       [address] [place] [id number]  [address], [address] [place] [address]     Schedule an appointment as soon as possible for a visit in 1 week  Make an appointment in 1 week to follow up with hospitalization, medications and follow up. Brandi De Luna MD  [address] [place] [id number] [phone]    Schedule an appointment as soon as possible for a visit in 2 weeks  GI- outpatient colonoscopy       Activity level: As tolerated     Dispo: return to SNF    Condition on discharge: Stable     Patient ID:  Wanda Aquino  [id number]  8 y.o.  1945    Admit date: 6/23/2022    Discharge date and time:  6/29/2022  2:51 PM    Admission Diagnoses:   Principal Problem:    Dysphagia  Active Problems:    Anemia    Hypomagnesemia    CAD (coronary artery disease)    HTN (hypertension)    Diabetes mellitus ([address]  Resolved Problems:    * No resolved hospital problems. *      Discharge Diagnoses:   Principal Problem:    Dysphagia  Active Problems:    Anemia    Hypomagnesemia    CAD (coronary artery disease)    HTN (hypertension)    Diabetes mellitus ([address]  Resolved Problems:    * No resolved hospital problems. *      Consults:  IP CONSULT TO GI  IP CONSULT TO IV TEAM    Hospital Course:   Patient Wanda Aquino is a 65 y.o. presented with Dysphagia [R13.10]   Patient presented to the ER sent from [facility] with c/o dysphagia sent in by GI for EGD/ botox. GI was consulted. Patient was followed and treated for;    1. Dysphagia: patient was sent in from nursing home to ER due to concerns for dysphagia. Pt reporting she felt like \" food was getting stuck. \" associated belching. Pt has been following with GI outpt. Concern that she was a high risk for aspiration and plan was for admission and EGD with possible botox/ possible need for peg. . She had an abnormal UGI outpt on 6/13-  Abnormal esophageal motility with combined findings of diffuse esophageal spasm and achalasia type pattern. Speech therapy was consulted here and feel that pt should be able to tolerate thin liquids. Easy to chew diet currently.  S/p EGD on 6/27 with dilation of esophagus and botox injection. UGI yesterday- showing delayed passage of barium contrast through the GE junction and retention of contrast in the esophagus with more likely sphincter dysmotility with lack of coordinated relaxation. Pt diet advanced to full liquids. Po meds resumed. PT reporting issues with nausea today and not feeling well. However per nursing diet was advanced and pt reporting feeling better. OK to dc this evening if pt tolerates diet ok. Gi ok to DC.      2. HTN: po meds resumed. Monitor bp    3. DM: check hga1c 4.5- continue insulin ss. Lantus on home meds- stop. Continue insulin ss for dc      4. Thyroid disease: po synthroid resumed.      5. Acute on chronic anemia: anemia panel reviewed. H & H stable. Occult stool neg     6. Deconditioning: pt resides at [facility]- noted muscle atrophy of lower ext. Non ambulatory.       7. Chronic pain- on ultram prn at rehab- on IV morphine prn as unable to take pills. Po ultram resumed     8. Hypokalemia/ hypomagnesemia: supplement      9. Nausea; monitor- prn antiemetics       OK to dc back to SNF per GI. Ok to dc this afternoon if pt tolerates dinner. Patient to be discharged in stable condition with the following medications, instructions and follow up.      Discharge Exam:  General Appearance: alert and oriented to person, place and time and in no acute distress  Skin: warm and dry  Head: normocephalic and atraumatic  Neck: neck supple and non tender without mass   Pulmonary/Chest: clear to auscultation bilaterally- Cardiovascular: normal rate, normal S1 and S2 and no carotid bruits  Abdomen: soft, non-tender, non-distended, normal bowel sounds  Extremities: no cyanosis, no clubbing and no edema  Neurologic: speech normal     I/O last 3 completed shifts: In: 2060 [P.O.:200; I.V.:1860]  Out: 300 [Urine:300]  No intake/output data recorded. LABS:  Recent Labs     06/27/22  0516 06/28/22  1045 06/29/22  0233    139 136   K 3.4* 3.4* 3.1*    102 100   CO2 29 25 26   BUN 5* 5* 5*   CREATININE 0.6 0.5 0.5   GLUCOSE 111* 99 94   CALCIUM 8.7 8.5* 8.2*       Recent Labs     06/28/22  1045 06/29/22  0233   WBC 7.8  8.1 8.3   RBC 3.31*  3.30* 3.00*   HGB 10.5*  10.3* 9.3*   HCT 32.8*  32.6* 29.3*   MCV 99.1  98.8 97.7   MCH 31.7  31.2 31.0   MCHC 32.0  31.6* 31.7*   RDW 14.1  14.3 14.1     366 315   MPV 11.1  11.2 11.1       No results for input(s): POCGLU in the last 72 hours. Imaging:  No results found. Patient Instructions:      Medication List      START taking these medications    acetaminophen 160 MG/5ML solution  Commonly known as: TYLENOL  Take 20.3 mLs by mouth every 6 hours as needed for Pain     * insulin lispro 100 UNIT/ML Soln injection vial  Commonly known as: HUMALOG  Inject 0-6 Units into the skin 3 times daily (with meals)     * insulin lispro 100 UNIT/ML Soln injection vial  Commonly known as: HUMALOG  Inject 0-3 Units into the skin nightly         * This list has 2 medication(s) that are the same as other medications prescribed for you. Read the directions carefully, and ask your doctor or other care provider to review them with you. CONTINUE taking these medications    amLODIPine 2.5 MG tablet  Commonly known as: NORVASC     ascorbic acid 500 MG tablet  Commonly known as: VITAMIN C     carvedilol 3.125 MG tablet  Commonly known as: COREG     Crestor 10 MG tablet  Generic drug: rosuvastatin  Take 1 tablet by mouth daily.      cyclobenzaprine 5 MG tablet  Commonly known as: FLEXERIL     docusate sodium 100 MG capsule  Commonly known as: COLACE     donepezil 5 MG tablet  Commonly known as: ARICEPT     losartan 25 MG tablet  Commonly known as: COZAAR     melatonin 3 MG Tabs tablet     ondansetron 4 MG tablet  Commonly known as: ZOFRAN     pantoprazole 40 MG tablet  Commonly known as: PROTONIX     sertraline 100 MG tablet  Commonly known as: ZOLOFT     Synthroid 50 MCG tablet  Generic drug: levothyroxine     therapeutic multivitamin-minerals tablet     traMADol 50 MG tablet  Commonly known as: ULTRAM     zinc sulfate 220 (50 Zn) MG capsule  Commonly known as: ZINCATE        STOP taking these medications    ibuprofen 200 MG tablet  Commonly known as:  Advil     insulin glargine 100 UNIT/ML injection vial  Commonly known as: LANTUS           Where to Get Your Medications      Information about where to get these medications is not yet available    Ask your nurse or doctor about these medications  · acetaminophen 160 MG/5ML solution  · insulin lispro 100 UNIT/ML Soln injection vial  · insulin lispro 100 UNIT/ML Soln injection vial           Note that more than 30 minutes was spent in preparing discharge papers, discussing discharge with patient, medication review, etc.    Signed:  Electronically signed by LILI Day on 6/29/2022 at 2:51 PM

## 2022-06-29 NOTE — PROGRESS NOTES
[facility] Progress Note    Admitting Date and Time: 6/23/2022  9:37 PM  Admit Dx: Dysphagia [R13.10]    Subjective:  Patient is being followed for Dysphagia [R13.10]     Patient sleeping - awakens easily  Feels tired   Reporting she does not feel well today  C/o nausea      ROS: denies fever, chills, cp, sob, n/v, HA unless stated above.  magnesium sulfate  2,000 mg IntraVENous Once    potassium bicarbonate  25 mEq Oral Once    amLODIPine  2.5 mg Oral Daily    carvedilol  3.125 mg Oral BID WC    rosuvastatin  10 mg Oral Daily    donepezil  5 mg Oral Nightly    sertraline  100 mg Oral Daily    losartan  25 mg Oral Daily    levothyroxine  50 mcg Oral Daily    miconazole nitrate   Topical BID    sodium chloride flush  5-40 mL IntraVENous 2 times per day    enoxaparin  40 mg SubCUTAneous Daily    insulin lispro  0-6 Units SubCUTAneous TID WC    insulin lispro  0-3 Units SubCUTAneous Nightly    pantoprazole (PROTONIX) 40 mg injection  40 mg IntraVENous Daily     traMADol, 50 mg, Q6H PRN  morphine, 2 mg, Q4H PRN  acetaminophen, 650 mg, Q6H PRN  sodium chloride flush, 5-40 mL, PRN  sodium chloride, , PRN  ondansetron, 4 mg, Q8H PRN   Or  ondansetron, 4 mg, Q6H PRN  polyethylene glycol, 17 g, Daily PRN  acetaminophen, 650 mg, Q6H PRN  metoprolol, 5 mg, Q6H PRN  hydrALAZINE, 10 mg, Q6H PRN  glucose, 4 tablet, PRN  dextrose bolus, 125 mL, PRN   Or  dextrose bolus, 250 mL, PRN  glucagon (rDNA), 1 mg, PRN  dextrose, 100 mL/hr, PRN         Objective:    BP (!) 175/76   Pulse 95   Temp 98.8 °F (37.1 °C) (Oral)   Resp 16   Ht 5' (1.524 m)   Wt 117 lb 8 oz (53.3 kg)   SpO2 99%   BMI 22.95 kg/m²   General Appearance: sleeping- awakens easily. Tired.  Oriented   Skin: warm and dry  Head: normocephalic and atraumatic  Neck: neck supple and non tender without mass   Pulmonary/Chest: clear to auscultation bilaterally-  Cardiovascular: normal rate, normal S1 and S2 and no carotid bruits  Abdomen: soft, non-tender, non-distended, normal bowel sounds  Extremities: no cyanosis, no clubbing and no edema, muscle atrophy of lower legs, left hand contracted   Neurologic: speech normal       Recent Labs     06/27/22  0516 06/28/22  1045 06/29/22  0233    139 136   K 3.4* 3.4* 3.1*    102 100   CO2 29 25 26   BUN 5* 5* 5*   CREATININE 0.6 0.5 0.5   GLUCOSE 111* 99 94   CALCIUM 8.7 8.5* 8.2*       Recent Labs     06/28/22  1045 06/29/22  0233   WBC 7.8  8.1 8.3   RBC 3.31*  3.30* 3.00*   HGB 10.5*  10.3* 9.3*   HCT 32.8*  32.6* 29.3*   MCV 99.1  98.8 97.7   MCH 31.7  31.2 31.0   MCHC 32.0  31.6* 31.7*   RDW 14.1  14.3 14.1     366 315   MPV 11.1  11.2 11.1         Assessment:    Principal Problem:    Dysphagia  Active Problems:    Anemia    Hypomagnesemia    CAD (coronary artery disease)    HTN (hypertension)    Diabetes mellitus ([facility]  Resolved Problems:    * No resolved hospital problems. *      Plan:  1. Dysphagia: patient was sent in from nursing home to ER due to concerns for dysphagia. Pt reporting she felt like \" food was getting stuck. \" associated belching. Pt has been following with GI outpt. Concern that she was a high risk for aspiration and plan was for admission and EGD with possible botox/ possible need for peg. . She had an abnormal UGI outpt on 6/13-  Abnormal esophageal motility with combined findings of diffuse esophageal spasm and achalasia type pattern. Speech therapy was consulted here and feel that pt should be able to tolerate thin liquids. Easy to chew diet currently. S/p EGD on 6/27 with dilation of esophagus and botox injection. UGI yesterday- showing delayed passage of barium contrast through the GE junction and retention of contrast in the esophagus with more likely sphincter dysmotility with lack of coordinated relaxation. Pt diet advanced to full liquids. Po meds resumed. PT reporting issues with nausea today and not feeling well.      2.  HTN: po meds resumed. Monitor bp  3. DM: check hga1c 4.5- continue insulin ss     4. Thyroid disease: po synthroid resumed.      5. Acute on chronic anemia: anemia panel reviewed.      6. Deconditioning: pt resides at [facility]- noted muscle atrophy of lower ext. Non ambulatory.       7. Chronic pain- on ultram prn at rehab- on IV morphine prn as unable to take pills. Po ultram resumed     8. Hypokalemia/ hypomagnesemia: supplement      9. Nausea; monitor- prn antiemetics      Dispo: pt resides at [facility]- plan is to return. No covid or precert needed. NOTE: This report was transcribed using voice recognition software. Every effort was made to ensure accuracy; however, inadvertent computerized transcription errors may be present.   Electronically signed by LILI Vicente on 6/29/2022 at 8:48 AM

## 2022-06-29 NOTE — PROGRESS NOTES
PROGRESS NOTE  By Lisseth Campos M.D. [facility]  Dr. Rajeev Hickman M.D.,  Dr. Karen Fairchild M.D.,   Dr. Rashaad Willis D.O.,  Dr. La Agudelo M.D.,  Dr. Rachna Briceno D.O.,  Yessi Owens D.O. Nadia Sand  65 y.o.  female    SUBJECTIVE:  Resolved epigastric abdominal pain. Tolerating full liquid diet. Daughter at bedside    OBJECTIVE:    BP (!) 175/76   Pulse 95   Temp 98.8 °F (37.1 °C) (Oral)   Resp 16   Ht 5' (1.524 m)   Wt 117 lb 8 oz (53.3 kg)   SpO2 99%   BMI 22.95 kg/m²     General: NAD/ female  HEENT: Anicteric sclera/moist oral mucosa/poor dentition  Neck: Supple/trachea midline  Chest: Symmetric excursion/nonlabored respirations  Cor: Regular  Abd.: Soft/NT/ND  Extr.:  Decreased muscle tone and bulk throughout  Skin: Warm and dry        DATA:    Monitor data reviewed -sinus rhythm noted.        Lab Results   Component Value Date    WBC 8.3 06/29/2022    RBC 3.00 06/29/2022    HGB 9.3 06/29/2022    HCT 29.3 06/29/2022    MCV 97.7 06/29/2022    MCH 31.0 06/29/2022    MCHC 31.7 06/29/2022    RDW 14.1 06/29/2022     06/29/2022    MPV 11.1 06/29/2022     Lab Results   Component Value Date     06/29/2022    K 3.1 06/29/2022    K 3.6 06/24/2022     06/29/2022    CO2 26 06/29/2022    BUN 5 06/29/2022    CREATININE 0.5 06/29/2022    CALCIUM 8.2 06/29/2022    PROT 6.1 06/24/2022    LABALBU 2.7 06/24/2022    LABALBU 4.4 06/04/2012    BILITOT 0.7 06/24/2022    ALKPHOS 126 06/24/2022    AST 20 06/24/2022    ALT 13 06/24/2022     No results found for: LIPASE  No results found for: AMYLASE      ASSESSMENT/PLAN:  Patient Active Problem List   Diagnosis    CAD (coronary artery disease)    S/P AVR (aortic valve replacement)    S/P MVR (mitral valve repair)    HTN (hypertension)    Dyslipidemia    Diabetes mellitus [facility]    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 (HCC)    Ulcer of calf with fat layer exposed, left ([address]    Decreased dorsalis pedis pulse    Hemiparesis of left nondominant side as late effect of cerebrovascular disease ([address]    Cannot walk    Squamous cell carcinoma of lower leg, left    Dysphagia    Dementia (HCC)    Hypothyroidism    Gastroesophageal reflux disease    Myocardial infarction ([address]    Major depressive disorder    Photophobia    Anemia    Hypomagnesemia     1.  [id number]  -Abnormal UGI 6/13  -EGD 6/27 with dilatation and Botox injection in the area of the lower esophageal sphincter  -Improved esophagogram  -Recommend upright for meals and 2 to 3 hours after meals  -Speech therapy recommendations noted -given patient poor dentition advance diet to bite-size  -Change Protonix to oral dosing     2.  Anemia  -Acute on chronic  -No evidence of overt bleed  -Stable H&H  -No significant iron deficiency  -Pending FOBT  -EGD as above  -Outpatient colonoscopy -TBD     Okay to be discharged from GI POV with follow-up in the office in 1 to 2 weeks after discharge-family to call for appointment and with questions/concerns at [phone]. Thank you for the opportunity to participate in the care of Mrs. Valentin Moody. Randy Rolon MD  6/29/2022  1:40 PM    NOTE:  This report was transcribed using voice recognition software. Every effort was made to ensure accuracy; however, inadvertent computerized transcription errors may be present.

## 2022-06-29 NOTE — PROGRESS NOTES
SPEECH LANGUAGE PATHOLOGY  DAILY PROGRESS NOTE        PATIENT NAME:  Adelina Jaramillo      :  1945          TODAY'S DATE:  2022 ROOM:  [address] [place], [place] [address]        SWALLOWING  Patient seen for f/u dysphagia management. Patient on a full liquid diet per GI rec. Family present. Patient was attempting to consume liquids in reclined position. Educated patient and family on compensatory strategies including being inclined when consuming PO. Patient demonstrated good toleration of liquids and a smoothie. DYSPHAGIA DIAGNOSIS:   Clinical indicators of functional oropharyngeal swallow for age/premorbid functioning     A video swallow eval was completed at a [place] facility on 2022 that did not show penetration or aspiration. Reviewed by this SLP.      An esophagram was completed on 2022 with following results:      1.  No aspiration during swallowing.       2.  Presbyesophagus with prominent esophageal contractions and spasms causing   retention and delayed clearance.       3.  Food retention in the dilated upper esophagus despite patient not having   eaten since early yesterday.       4.  No hiatal hernia.      The above would cause esophageal dysphagia which places pt at risk of aspiration after the swallow. DIET RECOMMENDATIONS:  Diet per primary physician or referring GI, however from an oropharyngeal standpoint pt should tolerate an easy to chew diet with thin liquids                 FEEDING RECOMMENDATIONS:                           Assistance level:  Stand by assistance is needed during all oral intake                             Compensatory strategies recommended: Small bites/sips             Oral- adequate labial seal and A-P transfer, no oral residue      Pharyngeal- No overt s/s of aspiration, no multiple swallows      Education- Pt educated on results and POC. Pt trained on compensatory strategies for safe swallow with good outcome. Questions answered. Will continue SP intervention as per previously established POC. Darcie KUMAR  [id number] [facility] [id number]  Speech Language Pathologist                CPT code(s) 18240  dysphagia tx  Total minutes :  15 minutes

## 2022-06-29 NOTE — CARE COORDINATION
D/c order noted; to transport to [facility] [address] via [facility]. Facility;/POA souleymane notified. no covid/ no precert. Jahaira Stewart.

## 2022-08-24 RX ORDER — ACETAMINOPHEN 325 MG/1
650 TABLET ORAL EVERY 6 HOURS PRN
COMMUNITY

## 2022-08-24 RX ORDER — HYDROCODONE BITARTRATE AND ACETAMINOPHEN 5; 325 MG/1; MG/1
1 TABLET ORAL EVERY 8 HOURS PRN
COMMUNITY

## 2022-08-24 NOTE — PROGRESS NOTES
Information request page was faxed to [phone] on 8/23/2022 and on 8/24/2022. Spoke to Latia Mckeon on 8/24/2022 and informed her the fax is being sent and we need this information ASAP in order for the patient to have the procedure on 8/25/2022.

## 2022-08-24 NOTE — PROGRESS NOTES
[facility] PRE-ADMISSION TESTING INSTRUCTIONS    The Preadmission Testing patient is instructed accordingly using the following criteria (check applicable):    ARRIVAL INSTRUCTIONS:  [x] Parking the day of Surgery is located in the Main Entrance lot. Upon entering the door, make an immediate right to the surgery reception desk    [x] Bring photo ID and insurance card    [x] Bring in a copy of Living will or Durable Power of  papers. [x] Please be sure to arrange for responsible adult to provide transportation to and from the hospital    [x] Please arrange for responsible adult to be with you for the 24 hour period post procedure due to having anesthesia    [x] If you awake am of surgery not feeling well or have temperature >100 please call [phone]    GENERAL INSTRUCTIONS:    [x] Nothing by mouth after midnight, including gum, candy, mints or water    [x] You may brush your teeth, but do not swallow any water    [x] Take medications as instructed with 1-2 oz of water    [x] Stop herbal supplements and vitamins 5 days prior to procedure    [] Follow preop dosing of blood thinners per physician instructions    [] Take 1/2 dose of evening insulin, but no insulin after midnight    [] No oral diabetic medications after midnight    [x] If diabetic and have low blood sugar or feel symptomatic, take 1-2oz apple juice only    [] Bring inhalers day of surgery    [] Bring C-PAP/ Bi-Pap day of surgery    [] Bring urine specimen day of surgery    [x] Shower or bath with soap, lather and rinse well, AM of Surgery, no lotion, powders or creams to surgical site    [] Follow bowel prep as instructed per surgeon    [x] No tobacco products within 24 hours of surgery     [x] No alcohol or illegal drug use within 24 hours of surgery.     [x] Jewelry, body piercing's, eyeglasses, contact lenses and dentures are not permitted into surgery (bring cases)      [x] Please do not wear any nail polish, make up or hair products on the day of surgery    [x] You can expect a call the business day prior to procedure to notify you if your arrival time changes    [x] If you receive a survey after surgery we would greatly appreciate your comments    [] Parent/guardian of a minor must accompany their child and remain on the premises  the entire time they are under our care     [] Pediatric patients may bring favorite toy, blanket or comfort item with them    [x] A caregiver or family member must remain with the patient during their stay if they are mentally handicapped, have dementia, disoriented or unable to use a call light or would be a safety concern if left unattended    [x] Please notify surgeon if you develop any illness between now and time of surgery (cold, cough, sore throat, fever, nausea, vomiting) or any signs of infections  including skin, wounds, and dental.    [x]  The Outpatient Pharmacy is available to fill your prescription here on your day of surgery, ask your preop nurse for details    [] Other instructions    EDUCATIONAL MATERIALS PROVIDED:    [] PAT Preoperative Education Packet/Booklet     [] Medication List    [] Transfusion bracelet applied with instructions    [] Shower with soap, lather and rinse well, and use CHG wipes provided the evening before surgery as instructed    [] Incentive spirometer with instructions

## 2022-08-24 NOTE — PROGRESS NOTES
Pt resides at [facility]. Pt is alert to self, able to use call light but does not sign for herself. Pt is not in any isolation and does not have any open wounds. Pt has history of cva with left hemiplegia, cad and avr. Pt is a full code. Pt's daughter, Sofia Harmon, is next of kin. She states will be present day of procedure. Pt to arrive by [id number].  Patricia aware that pt cannot go back [facility] [place] [facility]

## 2022-08-25 ENCOUNTER — ANESTHESIA EVENT (OUTPATIENT)
Dept: ENDOSCOPY | Age: 77
End: 2022-08-25
Payer: COMMERCIAL

## 2022-08-25 ENCOUNTER — ANESTHESIA (OUTPATIENT)
Dept: ENDOSCOPY | Age: 77
End: 2022-08-25
Payer: COMMERCIAL

## 2022-08-25 ENCOUNTER — HOSPITAL ENCOUNTER (OUTPATIENT)
Age: 77
Setting detail: OUTPATIENT SURGERY
Discharge: HOME | End: 2022-08-25
Attending: INTERNAL MEDICINE | Admitting: INTERNAL MEDICINE
Payer: COMMERCIAL

## 2022-08-25 VITALS
RESPIRATION RATE: 16 BRPM | DIASTOLIC BLOOD PRESSURE: 74 MMHG | TEMPERATURE: 98.5 F | OXYGEN SATURATION: 99 % | HEIGHT: 60 IN | WEIGHT: 100 LBS | HEART RATE: 66 BPM | BODY MASS INDEX: 19.63 KG/M2 | SYSTOLIC BLOOD PRESSURE: 176 MMHG

## 2022-08-25 PROCEDURE — 3700000001 HC ADD 15 MINUTES (ANESTHESIA): Performed by: INTERNAL MEDICINE

## 2022-08-25 PROCEDURE — 7100000010 HC PHASE II RECOVERY - FIRST 15 MIN: Performed by: INTERNAL MEDICINE

## 2022-08-25 PROCEDURE — 2580000003 HC RX 258: Performed by: NURSE ANESTHETIST, CERTIFIED REGISTERED

## 2022-08-25 PROCEDURE — 7100000011 HC PHASE II RECOVERY - ADDTL 15 MIN: Performed by: INTERNAL MEDICINE

## 2022-08-25 PROCEDURE — 3700000000 HC ANESTHESIA ATTENDED CARE: Performed by: INTERNAL MEDICINE

## 2022-08-25 PROCEDURE — 2709999900 HC NON-CHARGEABLE SUPPLY: Performed by: INTERNAL MEDICINE

## 2022-08-25 PROCEDURE — 3609027000 HC COLONOSCOPY: Performed by: INTERNAL MEDICINE

## 2022-08-25 PROCEDURE — 6360000002 HC RX W HCPCS: Performed by: NURSE ANESTHETIST, CERTIFIED REGISTERED

## 2022-08-25 RX ORDER — SODIUM CHLORIDE 0.9 % (FLUSH) 0.9 %
5-40 SYRINGE (ML) INJECTION PRN
Status: DISCONTINUED | OUTPATIENT
Start: 2022-08-25 | End: 2022-08-25 | Stop reason: HOSPADM

## 2022-08-25 RX ORDER — SODIUM CHLORIDE 0.9 % (FLUSH) 0.9 %
5-40 SYRINGE (ML) INJECTION EVERY 12 HOURS SCHEDULED
Status: DISCONTINUED | OUTPATIENT
Start: 2022-08-25 | End: 2022-08-25 | Stop reason: HOSPADM

## 2022-08-25 RX ORDER — PROPOFOL 10 MG/ML
INJECTION, EMULSION INTRAVENOUS PRN
Status: DISCONTINUED | OUTPATIENT
Start: 2022-08-25 | End: 2022-08-25 | Stop reason: SDUPTHER

## 2022-08-25 RX ORDER — SODIUM CHLORIDE 9 MG/ML
25 INJECTION, SOLUTION INTRAVENOUS PRN
Status: DISCONTINUED | OUTPATIENT
Start: 2022-08-25 | End: 2022-08-25 | Stop reason: HOSPADM

## 2022-08-25 RX ORDER — SODIUM CHLORIDE 9 MG/ML
INJECTION, SOLUTION INTRAVENOUS CONTINUOUS PRN
Status: DISCONTINUED | OUTPATIENT
Start: 2022-08-25 | End: 2022-08-25 | Stop reason: SDUPTHER

## 2022-08-25 RX ADMIN — SODIUM CHLORIDE: 9 INJECTION, SOLUTION INTRAVENOUS at 15:08

## 2022-08-25 RX ADMIN — PROPOFOL 140 MG: 10 INJECTION, EMULSION INTRAVENOUS at 15:08

## 2022-08-25 ASSESSMENT — PAIN SCALES - GENERAL
PAINLEVEL_OUTOF10: 0

## 2022-08-25 NOTE — ANESTHESIA POSTPROCEDURE EVALUATION
Department of Anesthesiology  Postprocedure Note    Patient: Leopoldo Solano  MRN: [id number]  [place]: 1945  Date of evaluation: 8/25/2022      Procedure Summary     Date: 08/25/22 Room / Location: [facility] / [facility]    Anesthesia Start: 1504 Anesthesia Stop: 3486    Procedure: COLORECTAL CANCER SCREENING, NOT HIGH RISK      ++FACILITY++ Diagnosis:       Special screening for malignant neoplasms, colon      (Special screening for malignant neoplasms, colon [Z12.11])    Surgeons: Charity Tabares DO Responsible Provider: Mile Zepeda MD    Anesthesia Type: MAC ASA Status: 4          Anesthesia Type: MAC    Shireen Phase I:      Shireen Phase II:        Anesthesia Post Evaluation    Patient location during evaluation: PACU  Patient participation: complete - patient participated  Level of consciousness: awake  Airway patency: patent  Nausea & Vomiting: no nausea and no vomiting  Complications: no  Cardiovascular status: hemodynamically stable  Respiratory status: acceptable  Hydration status: euvolemic

## 2022-08-25 NOTE — PROGRESS NOTES
Immediately prior to the procedure the patient's History and Physical was reviewed- there are no changes with the current vitals. BP (!) 176/73   Pulse 71   Resp 18   Ht 5' (1.524 m)   Wt 100 lb (45.4 kg)   SpO2 99%   BMI 19.53 kg/m²     No CP/SOB. Risks/benefits d/w pt/daughter. All questions answered. Proceed with Colonoscopy.     DO Marbin  8/25/2022  2:48 PM my family and the people that worry about me

## 2022-08-25 NOTE — ANESTHESIA PRE PROCEDURE
Department of Anesthesiology  Preprocedure Note       Name:  April Ramon   Age:  65 y.o.  :  1945                                          MRN:  [id number]         Date:  2022      Surgeon: Coleman Andujar):  Abhilash Solares DO    Procedure: Procedure(s):  COLORECTAL CANCER SCREENING, NOT HIGH RISK      ++FACILITY++    Medications prior to admission:   Prior to Admission medications    Medication Sig Start Date End Date Taking? Authorizing Provider   acetaminophen (TYLENOL) 325 MG tablet Take 650 mg by mouth every 6 hours as needed for Pain    Historical Provider, MD   HYDROcodone-acetaminophen (NORCO) 5-325 MG per tablet Take 1 tablet by mouth every 8 hours as needed for Pain. Historical Provider, MD   donepezil (ARICEPT) 5 MG tablet Take 5 mg by mouth nightly    Historical Provider, MD   ascorbic acid (VITAMIN C) 500 MG tablet Take 500 mg by mouth daily    Historical Provider, MD   docusate sodium (COLACE) 100 MG capsule Take 100 mg by mouth 2 times daily    Historical Provider, MD   cyclobenzaprine (FLEXERIL) 5 MG tablet Take 5 mg by mouth 3 times daily as needed for Muscle spasms    Historical Provider, MD   losartan (COZAAR) 25 MG tablet Take 25 mg by mouth daily    Historical Provider, MD   melatonin 3 MG TABS tablet Take 3 mg by mouth daily    Historical Provider, MD   amLODIPine (NORVASC) 2.5 MG tablet Take 2.5 mg by mouth daily    Historical Provider, MD   pantoprazole (PROTONIX) 40 MG tablet Take 40 mg by mouth daily    Historical Provider, MD   zinc sulfate (ZINCATE) 220 (50 Zn) MG capsule Take 50 mg by mouth daily    Historical Provider, MD   ondansetron (ZOFRAN) 4 MG tablet Take 4 mg by mouth every 8 hours as needed for Nausea or Vomiting    Historical Provider, MD   Multiple Vitamins-Minerals (THERAPEUTIC MULTIVITAMIN-MINERALS) tablet Take 1 tablet by mouth daily    Historical Provider, MD   CRESTOR 10 MG tablet Take 1 tablet by mouth daily.  13   Wong Curtis MD   levothyroxine (SYNTHROID) 50 MCG tablet Take 50 mcg by mouth daily. Historical Provider, MD   carvedilol (COREG) 3.125 MG tablet Take 3.125 mg by mouth 2 times daily (with meals). Historical Provider, MD   sertraline (ZOLOFT) 100 MG tablet Take 100 mg by mouth at bedtime    Historical Provider, MD       Current medications:    No current facility-administered medications for this visit. No current outpatient medications on file. Facility-Administered Medications Ordered in Other Visits   Medication Dose Route Frequency Provider Last Rate Last Admin    sodium chloride flush 0.9 % injection 5-40 mL  5-40 mL IntraVENous 2 times per day Elspeth Cord, DO        sodium chloride flush 0.9 % injection 5-40 mL  5-40 mL IntraVENous PRN Elspeth Cord, DO        0.9 % sodium chloride infusion  25 mL IntraVENous PRN Elspeth Cord, DO           Allergies:     Allergies   Allergen Reactions    Phenergan [Promethazine Hcl]        Problem List:    Patient Active Problem List   Diagnosis Code    CAD (coronary artery disease) I25.10    S/P AVR (aortic valve replacement) Z95.2    S/P MVR (mitral valve repair) Z98.890    HTN (hypertension) I10    Dyslipidemia E78.5    Diabetes mellitus ([facility] [address] E11.9    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 ([facility]) I70.249    Ulcer of calf with fat layer exposed, left ([facility]) L97.222    Decreased dorsalis pedis pulse R09.89    Hemiparesis of left nondominant side as late effect of cerebrovascular disease ([address] I69.954    Cannot walk R26.2    Squamous cell carcinoma of lower leg, left C44.729    Dysphagia R13.10    Dementia (HCC) F03.90    Hypothyroidism E03.9    Gastroesophageal reflux disease K21.9    Myocardial infarction ([facility] [address] I21.9    Major depressive disorder F32.9    Photophobia H53.149    Anemia D64.9    Hypomagnesemia E83.42       Past Medical History:        Diagnosis Date    Aortic stenosis     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 ([facility] [address] 10/19/2021    CAD (coronary artery disease)     Cannot walk 10/19/2021    Cerebral artery occlusion with cerebral infarction [facility])     Cerebrovascular disease     Contracture of left hand     Decreased dorsalis pedis pulse 10/19/2021    Diabetes mellitus (HCC)     Diverticulitis     dyslipidemia     Dysphagia     GERD (gastroesophageal reflux disease)     Hemiparesis of left nondominant side as late effect of cerebrovascular disease ([address] 10/19/2021    Hypertension     Hypothyroidism     MI (myocardial infarction) ([facility] [address]     questionable 1986    Obesity     Osteoporosis     Thyroid disease     TIA (transient ischemic attack)     several post op TIAs    Type II or unspecified type diabetes mellitus without mention of complication, not stated as uncontrolled        Past Surgical History:        Procedure Laterality Date    APPENDECTOMY      CARDIAC CATHETERIZATION  06/06/2012    Heart Lab, EF 65%, two vessel CAD, total prox RCA wtih left to right collaterals, prox OM branch 80% and mild CX 40%, severe aortic stenosis. Surgery consult advised for CABG and AVR. Destinee Whitley CARDIAC SURGERY  06/15/2012    Epiaortic ultrasound scan. LISET. CABG x2 with reverse SVG to PDA, reverse SVG to second OMB. Aortic valve replacement with size 21 Mosaic ultra porcine heart valve.   Mitral valve repair with insertion of size 28 future annuloplasty band, [address] REPLACEMENT  06/01/2012    AVR size 21 mosaic ultra porcine valve     CHOLECYSTECTOMY      CORONARY ARTERY BYPASS GRAFT  06/15/2012    DIAGNOSTIC CARDIAC CATH LAB PROCEDURE      DILATATION, ESOPHAGUS      HERNIA REPAIR      HYSTERECTOMY (CERVIX STATUS UNKNOWN)      age 43    TONSILLECTOMY      UPPER GASTROINTESTINAL ENDOSCOPY N/A 06/27/2022    EGD ESOPHAGOGASTRODUODENOSCOPY WITH BOTOX performed by Víctor Nichols MD at [address] [facility] VASCULAR SURGERY         Social History:    Social History Tobacco Use    Smoking status: Former     Packs/day: 0.50     Types: Cigarettes     Quit date: 1970     Years since quittin.2    Smokeless tobacco: Never   Substance Use Topics    Alcohol use: No                                Counseling given: Not Answered      Vital Signs (Current): There were no vitals filed for this visit. BP Readings from Last 3 Encounters:   22 (!) 176/73   22 (!) 148/75   22 137/66       NPO Status:  GREATER THAN 8 HOURS                                                                               BMI:   Wt Readings from Last 3 Encounters:   22 100 lb (45.4 kg)   22 117 lb 8 oz (53.3 kg)   21 118 lb (53.5 kg)     There is no height or weight on file to calculate BMI.    CBC:   Lab Results   Component Value Date/Time    WBC 8.3 2022 02:33 AM    RBC 3.00 2022 02:33 AM    HGB 9.3 2022 02:33 AM    HCT 29.3 2022 02:33 AM    MCV 97.7 2022 02:33 AM    RDW 14.1 2022 02:33 AM     2022 02:33 AM       CMP:   Lab Results   Component Value Date/Time     2022 02:33 AM    K 3.1 2022 02:33 AM    K 3.6 2022 09:46 AM     2022 02:33 AM    CO2 26 2022 02:33 AM    BUN 5 2022 02:33 AM    CREATININE 0.5 2022 02:33 AM    GFRAA >60 2022 02:33 AM    LABGLOM >60 2022 02:33 AM    GLUCOSE 94 2022 02:33 AM    GLUCOSE 130 2012 10:00 AM    PROT 6.1 2022 09:46 AM    CALCIUM 8.2 2022 02:33 AM    BILITOT 0.7 2022 09:46 AM    ALKPHOS 126 2022 09:46 AM    AST 20 2022 09:46 AM    ALT 13 2022 09:46 AM       POC Tests: No results for input(s): POCGLU, POCNA, POCK, POCCL, POCBUN, POCHEMO, POCHCT in the last 72 hours.     Coags:   Lab Results   Component Value Date/Time    PROTIME 13.1 2012 10:00 AM    INR 1.2 2012 10:00 AM    APTT 31.5 2012 10:00 AM       HCG (If Applicable): No results found for: PREGTESTUR, PREGSERUM, HCG, HCGQUANT     ABGs: No results found for: PHART, PO2ART, YPX3HWD, CPR2FEF, BEART, V9WXAMHC     Type & Screen (If Applicable):  No results found for: LABABO, LABRH    Drug/Infectious Status (If Applicable):  No results found for: HIV, HEPCAB    COVID-19 Screening (If Applicable): No results found for: COVID19        Anesthesia Evaluation  Patient summary reviewed no history of anesthetic complications:   Airway: Mallampati: II  TM distance: >3 FB   Neck ROM: full  Mouth opening: > = 3 FB   Dental:          Pulmonary: breath sounds clear to auscultation  (+) asthma:                           ROS comment: Former Smoker   Cardiovascular:    (+) hypertension:, valvular problems/murmurs (AVR, MVR):, past MI: > 6 months, CAD: obstructive, CABG/stent:, hyperlipidemia        Rhythm: regular  Rate: normal                    Neuro/Psych:   (+) CVA (Hemiparesis of left nondominant side as late effect of cerebrovascular disease ([facility] [address])): residual symptoms, depression/anxiety dementia   (-) TIA and psychiatric history           GI/Hepatic/Renal:   (+) GERD:, bowel prep,          ROS comment: Dysphagia. Endo/Other:    (+) DiabetesType II DM, using insulin, hypothyroidism, blood dyscrasia: anemia:., .                 Abdominal:             Vascular:   + PVD, aortic or cerebral, . Other Findings:             Anesthesia Plan      MAC     ASA 4       Induction: intravenous. Anesthetic plan and risks discussed with patient. Plan discussed with CRNA.                         Sarina Powell MD   8/25/2022

## 2022-08-25 NOTE — H&P
[facility]  Dr. Suzanne Young M.D.,  Dr. aKmaljit Mcclain M.D.,  MECCA Avalos.DAVID.,  Dr. Sanju Matias D.O. ,  Dr. Norma Ramon M.D.,         Siena Kim  65 y.o.  female        HPI: 67-year-old female patient from a nursing home with extensive past medical history of aortic stenosis, CAD, CABG x2, CVA, diabetes mellitus, hyperlipidemia, hypertension, hypothyroidism and dysphagia/achalasia. Pt has had anemia, wt loss, protein energy malnutrition. Pt last colonoscopy greater than 10 years ago. No reported fmhx CRC or polyps. PI have discussed patient with her daughter over the phone Freida Foss [phone]) about her achalasia and aspiration risks and anemia and colonoscopy.         Information sources:   -Patient  -family  -medical record  -health care team     PMHx:  Past Medical History[]Expand by Default        Past Medical History:   Diagnosis Date    Abnormal stress ECG      Aortic stenosis      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 ([address] 10/19/2021    CAD (coronary artery disease)      Cannot walk 10/19/2021    Cerebrovascular disease      Decreased dorsalis pedis pulse 10/19/2021    Diabetes mellitus ([address]      Diverticulitis      dyslipidemia      Hemiparesis of left nondominant side as late effect of cerebrovascular disease ([address] 10/19/2021    Hypertension      Hypothyroidism      MI (myocardial infarction) ([address]       questionable 1986    Obesity      Osteoporosis      Squamous cell carcinoma of lower leg, left 11/2/2021    Thyroid disease      TIA (transient ischemic attack)       several post op TIAs    Type II or unspecified type diabetes mellitus without mention of complication, not stated as uncontrolled      Ulcer of calf with fat layer exposed, left ([address] 10/19/2021            PSHx:  Past Surgical History[]Expand by Default         Past Surgical History:   Procedure Laterality Date    APPENDECTOMY        CARDIAC CATHETERIZATION 6/06/12     Heart Lab, EF 65%, two vessel CAD, total prox RCA wtih left to right collaterals, prox OM branch 80% and mild CX 40%, severe aortic stenosis. Surgery consult advised for CABG and AVR. CARDIAC SURGERY   6/15/12     Epiaortic ultrasound scan. LISET. CABG x2 with reverse SVG to PDA, reverse SVG to second OMB. Aortic valve replacement with size 21 Mosaic ultra porcine heart valve.   Mitral valve repair with insertion of size 28 future annuloplasty band, [facility] VALVE REPLACEMENT   June 2012     AVR size 21 mosaic ultra porcine valve     CHOLECYSTECTOMY        CORONARY ARTERY BYPASS GRAFT   6/15/12    DIAGNOSTIC CARDIAC CATH LAB PROCEDURE        DILATATION, ESOPHAGUS        HERNIA REPAIR        HYSTERECTOMY         age 43    TONSILLECTOMY        VASCULAR SURGERY                Meds:  See list on admission 8/25/22 too  Current Facility-Administered Medications[]Expand by Default             Current Facility-Administered Medications   Medication Dose Route Frequency Provider Last Rate Last Admin    sodium chloride flush 0.9 % injection 5-40 mL  5-40 mL IntraVENous 2 times per day Milta Noon, APRN - CNP        sodium chloride flush 0.9 % injection 5-40 mL  5-40 mL IntraVENous PRN Milta Noon, APRN - CNP        0.9 % sodium chloride infusion   IntraVENous PRN Milta Noon, APRN - CNP        enoxaparin (LOVENOX) injection 40 mg  40 mg SubCUTAneous Daily Milta Noon, APRN - CNP   40 mg at 06/24/22 0834    ondansetron (ZOFRAN-ODT) disintegrating tablet 4 mg  4 mg Oral Q8H PRN Milta Noon, APRN - CNP         Or    ondansetron (ZOFRAN) injection 4 mg  4 mg IntraVENous Q6H PRN Milta Noon, APRN - CNP        polyethylene glycol (GLYCOLAX) packet 17 g  17 g Oral Daily PRN Milta Noon, APRN - CNP        acetaminophen (TYLENOL) tablet 650 mg  650 mg Oral Q6H PRN Milta Noon, APRN - CNP         Or    acetaminophen (TYLENOL) suppository 650 mg  650 mg Rectal Q6H PRN Lul Glenwood, APRN - CNP        metoprolol (LOPRESSOR) injection 5 mg  5 mg IntraVENous Q6H PRN Lul Glenwood, APRN - CNP        hydrALAZINE (APRESOLINE) injection 10 mg  10 mg IntraVENous Q6H PRN Lul Oscar, APRN - CNP   10 mg at 22 0834    [START ON 2022] levothyroxine (SYNTHROID) injection 37.5 mcg  37.5 mcg IntraVENous Daily Lul Glenwood, APRN - CNP        insulin lispro (HUMALOG) injection vial 0-6 Units  0-6 Units SubCUTAneous TID WC Lul Glenwood, APRN - CNP        insulin lispro (HUMALOG) injection vial 0-3 Units  0-3 Units SubCUTAneous Nightly Lul Oscar, APRN - CNP        glucose chewable tablet 16 g  4 tablet Oral PRN Lul Glenwood, APRN - CNP        dextrose bolus 10% 125 mL  125 mL IntraVENous PRN Lul Glenwood, APRN - CNP         Or    dextrose bolus 10% 250 mL  250 mL IntraVENous PRN Lul Oscar, APRN - CNP        glucagon (rDNA) injection 1 mg  1 mg IntraMUSCular PRN Lul Oscar, APRN - CNP        dextrose 5 % solution  100 mL/hr IntraVENous PRN Lul Oscar, APRN - CNP        lactated ringers infusion   IntraVENous Continuous Je Valdez MD 75 mL/hr at 22 0839 Rate Change at 22 0839    pantoprazole (PROTONIX) 40 mg in sodium chloride (PF) 10 mL injection  40 mg IntraVENous Daily Lul Glenwood, APRN - CNP   40 mg at 22 6265            SocHx:  Social History   []Expand by Default            Socioeconomic History    Marital status:         Spouse name: Not on file    Number of children: Not on file    Years of education: Not on file    Highest education level: Not on file   Occupational History    Not on file   Tobacco Use    Smoking status: Former Smoker       Packs/day: 0.50       Quit date: 1970       Years since quittin.0    Smokeless tobacco: Never Used   Substance and Sexual Activity    Alcohol use: No    Drug use: No    Sexual activity: Not on file   Other Topics Concern    Not on file   Social History Narrative    Not on file      Social Determinants of Health          Financial Resource Strain:     Difficulty of Paying Living Expenses: Not on file   Food Insecurity:     Worried About [address] in the Last Year: Not on file    Ran Out of Food in the Last Year: Not on file   Transportation Needs:     Lack of Transportation (Medical): Not on file    Lack of Transportation (Non-Medical): Not on file   Physical Activity:     Days of Exercise per Week: Not on file    Minutes of Exercise per Session: Not on file   Stress:     Feeling of Stress : Not on file   Social Connections:     Frequency of Communication with Friends and Family: Not on file    Frequency of Social Gatherings with Friends and Family: Not on file    Attends Hinduism Services: Not on file    Active Member of Clubs or Organizations: Not on file    Attends Club or Organization Meetings: Not on file    Marital Status: Not on file   Intimate Partner Violence:     Fear of Current or Ex-Partner: Not on file    Emotionally Abused: Not on file    Physically Abused: Not on file    Sexually Abused: Not on file   Housing Stability:     Unable to Pay for Housing in the Last Year: Not on file    Number of [place] in the Last Year: Not on file    Unstable Housing in the Last Year: Not on file            FamHx:  Family History[]Expand by Default         Family History   Problem Relation Age of Onset    High Blood Pressure Father      Heart Disease Father      Heart Disease Brother      High Blood Pressure Brother              Allergy:       Allergies   Allergen Reactions    Phenergan [Promethazine Hcl]              ROS: As described in the HPI and in addition is negative upon detailed review of systems or unobtainable unless otherwise stated in this dictation. PE:  VSS prior to admission and will be updated on admission.      Gen.: ED/elderly  female  Head: Atraumatic/normocephalic  Eyes:Anicteric sclera/no conjunctival erythema  ENT: Some scratching/ulcerations on the nose. Moist oral mucosa  Neck: Supple with trachea midline  Chest: CTA B. Midline surgical scar consistent with sternotomy  Cor: Regular rhythm and rate  Abd.: Soft and obese. Nontender  Extr.:  No significant peripheral edema  Muscles: Decreased muscle tone and bulk throughout  Skin: Warm and dry              ASSESSMENT/PLAN:      Patient Active Problem List   Diagnosis    CAD (coronary artery disease)    S/P AVR (aortic valve replacement)    S/P MVR (mitral valve repair)    HTN (hypertension)    Dyslipidemia    Diabetes mellitus (HCC)    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 (HCC)    Ulcer of calf with fat layer exposed, left (HCC)    Decreased dorsalis pedis pulse    Hemiparesis of left nondominant side as late effect of cerebrovascular disease (HCC)    Cannot walk    Squamous cell carcinoma of lower leg, left    Dysphagia    Dementia (HCC)    Hypothyroidism    Gastroesophageal reflux disease    Myocardial infarction ([facility]    Major depressive disorder    Photophobia      1. Anemia    2. Wt Loss    3. Protein Energy Malnutrition    4. CRC Screeing -- last colonoscopy greater than 10 years ago, no fmhx    5. Dysphagia/achalasia hx    6. H/O CVA with hemiparesis    7. H/O Dementia     8. I have discussed patient with her daughter over the phone Edra Blend [phone]) about her achalasia and aspiration risks and anemia and risks/benefits of colonoscopy.      Cedric Phelan D.O.  8/25/22  [id number]

## 2022-08-25 NOTE — OP NOTE
Operative Note      Patient: Gale Pfeiffer  YOB: 1945  MRN: [id number]    Date of Procedure: 8/25/2022    Pre-Op Diagnosis: Special screening for malignant neoplasms, colon [Z12.11], Anemia, wt loss    Post-Op Diagnosis: SAME       Procedure(s):  COLORECTAL CANCER SCREENING, NOT HIGH RISK      ++FACILITY++    Surgeon(s):  Mika Shabazz DO    Assistant:   * No surgical staff found *    Anesthesia: Monitor Anesthesia Care    Estimated Blood Loss (mL): None    Complications: None    Specimens:   * No specimens in log *    Implants:  * No implants in log *      Drains: * No LDAs found *    Detailed Description of Procedure:   Colonoscopy note    Indication: screening colonoscopy, anemia, wt loss    Consent: Informed consent was obtained from the patient including and not limited to risk of perforation, bleeding, infection, dental breakage, ileus, need for surgery, or worst case death. Sedation  MAC    Estimated Blood Loss -- None    Endoscope was advanced through anus to cecum. The ileocecal valve and appendiceal orifice were identified and pictures were taken. Preparation is good. Patient tolerated procedure well. NO fresh or old blood on exam.  Attempted to cannulate TI and could see the distal 2-3cm but would not easily advance so did not attempt to loop scope to advance. Pt has mild to moderate diverticulosis from sigmoid to ascending colon with no bleeding stigmata. OTHERWISE:    Cecum is normal  Ascending colon is normal   Transverse colon is normal  Descending colon is normal  Sigmoid colon is normal  Rectum direct views are normal  Retroflexion in rectum shows normal mucosa and dentate line with grade 1-2 IH's and EH's    IMPRESSION AND PLAN:   1. Mild to moderate diverticulosis from sigmoid to ascending colon with no stigmata of bleeding. 2.  No fresh or old blood seen on exam.    3.  Grade 1-2 IH's and EH's    4.   Ok to D/C back to nursing home today per outpt surgery hospital protocol. No driving. Ok for diet as tolerated. Had d/w daughter again going over results of colon. She again refuses consideration for PEG tube. She understands significant aspiration risk that could be life threatening given her esophageal pathology. Small meals and remain upright at least 4 hours after eating. Call if any issues and would repeat EGD with LES BOTOX. Pt to F/U in office as needed. 2.  Follow up as outpatient in office, call [phone] to schedule for appointment if needed. Repeat colonoscopy only if alarm symptoms (pain, bleeding, weight loss, diarrhea or sudden onset constipation) occur.        Electronically signed by Yobani Davis DO on 8/25/2022 at 2:56 PM

## 2023-03-09 ENCOUNTER — HOSPITAL ENCOUNTER (EMERGENCY)
Age: 78
Discharge: HOME | End: 2023-03-10
Attending: EMERGENCY MEDICINE
Payer: COMMERCIAL

## 2023-03-09 ENCOUNTER — APPOINTMENT (OUTPATIENT)
Dept: CT IMAGING | Age: 78
End: 2023-03-09
Payer: COMMERCIAL

## 2023-03-09 DIAGNOSIS — N30.01 ACUTE CYSTITIS WITH HEMATURIA: Primary | ICD-10-CM

## 2023-03-09 DIAGNOSIS — R11.2 NAUSEA AND VOMITING, UNSPECIFIED VOMITING TYPE: ICD-10-CM

## 2023-03-09 LAB
ABO/RH: NORMAL
ALBUMIN SERPL-MCNC: 3 G/DL (ref 3.5–5.2)
ALP BLD-CCNC: 94 U/L (ref 35–104)
ALT SERPL-CCNC: 20 U/L (ref 0–32)
ANION GAP SERPL CALCULATED.3IONS-SCNC: 7 MMOL/L (ref 7–16)
ANTIBODY SCREEN: NORMAL
APTT: 28.7 SEC (ref 24.5–35.1)
AST SERPL-CCNC: 28 U/L (ref 0–31)
BACTERIA: ABNORMAL /HPF
BASOPHILS ABSOLUTE: 0.05 E9/L (ref 0–0.2)
BASOPHILS RELATIVE PERCENT: 0.6 % (ref 0–2)
BILIRUB SERPL-MCNC: 0.2 MG/DL (ref 0–1.2)
BILIRUBIN DIRECT: <0.2 MG/DL (ref 0–0.3)
BILIRUBIN URINE: NEGATIVE
BILIRUBIN, INDIRECT: ABNORMAL MG/DL (ref 0–1)
BLOOD, URINE: ABNORMAL
BUN BLDV-MCNC: 20 MG/DL (ref 6–23)
CALCIUM SERPL-MCNC: 8.9 MG/DL (ref 8.6–10.2)
CHLORIDE BLD-SCNC: 102 MMOL/L (ref 98–107)
CLARITY: CLEAR
CO2: 26 MMOL/L (ref 22–29)
COLOR: YELLOW
CREAT SERPL-MCNC: 0.6 MG/DL (ref 0.5–1)
EOSINOPHILS ABSOLUTE: 0.24 E9/L (ref 0.05–0.5)
EOSINOPHILS RELATIVE PERCENT: 3.1 % (ref 0–6)
GFR SERPL CREATININE-BSD FRML MDRD: >60 ML/MIN/1.73
GLUCOSE BLD-MCNC: 126 MG/DL (ref 74–99)
GLUCOSE URINE: NEGATIVE MG/DL
HCT VFR BLD CALC: 28.6 % (ref 34–48)
HCT VFR BLD CALC: 31.3 % (ref 34–48)
HEMOGLOBIN: 9.1 G/DL (ref 11.5–15.5)
HEMOGLOBIN: 9.8 G/DL (ref 11.5–15.5)
IMMATURE GRANULOCYTES #: 0.04 E9/L
IMMATURE GRANULOCYTES %: 0.5 % (ref 0–5)
INR BLD: 1
KETONES, URINE: NEGATIVE MG/DL
LACTIC ACID: 1.1 MMOL/L (ref 0.5–2.2)
LEUKOCYTE ESTERASE, URINE: ABNORMAL
LIPASE: 20 U/L (ref 13–60)
LYMPHOCYTES ABSOLUTE: 2.45 E9/L (ref 1.5–4)
LYMPHOCYTES RELATIVE PERCENT: 31.6 % (ref 20–42)
MCH RBC QN AUTO: 31 PG (ref 26–35)
MCHC RBC AUTO-ENTMCNC: 31.3 % (ref 32–34.5)
MCV RBC AUTO: 99.1 FL (ref 80–99.9)
MONOCYTES ABSOLUTE: 0.51 E9/L (ref 0.1–0.95)
MONOCYTES RELATIVE PERCENT: 6.6 % (ref 2–12)
NEUTROPHILS ABSOLUTE: 4.46 E9/L (ref 1.8–7.3)
NEUTROPHILS RELATIVE PERCENT: 57.6 % (ref 43–80)
NITRITE, URINE: NEGATIVE
PDW BLD-RTO: 13.1 FL (ref 11.5–15)
PH UA: 6 (ref 5–9)
PLATELET # BLD: 210 E9/L (ref 130–450)
PMV BLD AUTO: 11.6 FL (ref 7–12)
POTASSIUM SERPL-SCNC: 4.3 MMOL/L (ref 3.5–5)
PROTEIN UA: NEGATIVE MG/DL
PROTHROMBIN TIME: 11.5 SEC (ref 9.3–12.4)
RBC # BLD: 3.16 E12/L (ref 3.5–5.5)
RBC UA: ABNORMAL /HPF (ref 0–2)
SODIUM BLD-SCNC: 135 MMOL/L (ref 132–146)
SPECIFIC GRAVITY UA: <=1.005 (ref 1–1.03)
TOTAL PROTEIN: 6 G/DL (ref 6.4–8.3)
UROBILINOGEN, URINE: 0.2 E.U./DL
WBC # BLD: 7.8 E9/L (ref 4.5–11.5)
WBC UA: ABNORMAL /HPF (ref 0–5)

## 2023-03-09 PROCEDURE — 85610 PROTHROMBIN TIME: CPT

## 2023-03-09 PROCEDURE — 6360000002 HC RX W HCPCS

## 2023-03-09 PROCEDURE — 86850 RBC ANTIBODY SCREEN: CPT

## 2023-03-09 PROCEDURE — 85730 THROMBOPLASTIN TIME PARTIAL: CPT

## 2023-03-09 PROCEDURE — 86901 BLOOD TYPING SEROLOGIC RH(D): CPT

## 2023-03-09 PROCEDURE — 2580000003 HC RX 258

## 2023-03-09 PROCEDURE — 81001 URINALYSIS AUTO W/SCOPE: CPT

## 2023-03-09 PROCEDURE — 86900 BLOOD TYPING SEROLOGIC ABO: CPT

## 2023-03-09 PROCEDURE — 80048 BASIC METABOLIC PNL TOTAL CA: CPT

## 2023-03-09 PROCEDURE — C9113 INJ PANTOPRAZOLE SODIUM, VIA: HCPCS

## 2023-03-09 PROCEDURE — 96374 THER/PROPH/DIAG INJ IV PUSH: CPT

## 2023-03-09 PROCEDURE — 85025 COMPLETE CBC W/AUTO DIFF WBC: CPT

## 2023-03-09 PROCEDURE — 74177 CT ABD & PELVIS W/CONTRAST: CPT

## 2023-03-09 PROCEDURE — 85014 HEMATOCRIT: CPT

## 2023-03-09 PROCEDURE — 36415 COLL VENOUS BLD VENIPUNCTURE: CPT

## 2023-03-09 PROCEDURE — 83605 ASSAY OF LACTIC ACID: CPT

## 2023-03-09 PROCEDURE — A4216 STERILE WATER/SALINE, 10 ML: HCPCS

## 2023-03-09 PROCEDURE — 6360000004 HC RX CONTRAST MEDICATION: Performed by: RADIOLOGY

## 2023-03-09 PROCEDURE — 85018 HEMOGLOBIN: CPT

## 2023-03-09 PROCEDURE — 93005 ELECTROCARDIOGRAM TRACING: CPT | Performed by: EMERGENCY MEDICINE

## 2023-03-09 PROCEDURE — 96375 TX/PRO/DX INJ NEW DRUG ADDON: CPT

## 2023-03-09 PROCEDURE — 83690 ASSAY OF LIPASE: CPT

## 2023-03-09 PROCEDURE — 80076 HEPATIC FUNCTION PANEL: CPT

## 2023-03-09 PROCEDURE — 99285 EMERGENCY DEPT VISIT HI MDM: CPT

## 2023-03-09 RX ADMIN — CEFTRIAXONE SODIUM 2000 MG: 2 INJECTION, POWDER, FOR SOLUTION INTRAMUSCULAR; INTRAVENOUS at 20:46

## 2023-03-09 RX ADMIN — IOPAMIDOL 75 ML: 755 INJECTION, SOLUTION INTRAVENOUS at 16:20

## 2023-03-09 RX ADMIN — PANTOPRAZOLE SODIUM 40 MG: 40 INJECTION, POWDER, LYOPHILIZED, FOR SOLUTION INTRAVENOUS at 14:43

## 2023-03-09 ASSESSMENT — PAIN DESCRIPTION - LOCATION
LOCATION: BUTTOCKS
LOCATION: BUTTOCKS

## 2023-03-09 ASSESSMENT — PAIN SCALES - GENERAL
PAINLEVEL_OUTOF10: 3
PAINLEVEL_OUTOF10: 3

## 2023-03-09 ASSESSMENT — PAIN - FUNCTIONAL ASSESSMENT
PAIN_FUNCTIONAL_ASSESSMENT: 0-10
PAIN_FUNCTIONAL_ASSESSMENT: 0-10

## 2023-03-09 ASSESSMENT — PAIN DESCRIPTION - PAIN TYPE
TYPE: CHRONIC PAIN
TYPE: CHRONIC PAIN

## 2023-03-09 ASSESSMENT — PAIN DESCRIPTION - DESCRIPTORS
DESCRIPTORS: ACHING
DESCRIPTORS: ACHING

## 2023-03-09 ASSESSMENT — ENCOUNTER SYMPTOMS
BLOOD IN STOOL: 1
VOMITING: 1

## 2023-03-09 NOTE — ED PROVIDER NOTES
65y.o. year old female presenting to the emergency room with concerns of coffee-ground emesis, melena. Patient denies any symptomatology on initial exam.  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 Sent in from [address] [place] [address] Patient reported to have dark stools with coffee-ground emesis episode. Denies any chest pain shortness of breath nausea abdominal pain headache    Chief Complaint   Patient presents with    Emesis     Ground coffee emesis      Melena       Review of Systems   Unable to perform ROS: Dementia   Gastrointestinal:  Positive for blood in stool (Melena) and vomiting. Physical Exam  Vitals reviewed. Constitutional:       General: She is not in acute distress. Appearance: Normal appearance. HENT:      Head: Normocephalic. Right Ear: External ear normal.      Left Ear: External ear normal.      Nose: Nose normal.      Mouth/Throat:      Pharynx: Oropharynx is clear. Eyes:      General: No scleral icterus. Right eye: No discharge. Left eye: No discharge. Conjunctiva/sclera: Conjunctivae normal.   Cardiovascular:      Rate and Rhythm: Normal rate and regular rhythm. Heart sounds: No friction rub. No gallop. Pulmonary:      Effort: Pulmonary effort is normal. No respiratory distress. Breath sounds: No stridor. No rhonchi. Abdominal:      General: There is no distension. Palpations: Abdomen is soft. Tenderness: There is no abdominal tenderness. There is no guarding or rebound. Musculoskeletal:         General: No tenderness or deformity. Cervical back: Normal range of motion and neck supple. No rigidity or tenderness. Right lower leg: No edema. Left lower leg: No edema. Skin:     General: Skin is warm. Coloration: Skin is not jaundiced. Findings: Rash present. Neurological:      Mental Status: She is alert. Mental status is at baseline. Sensory: No sensory deficit. Motor: No weakness. Psychiatric:         Mood and Affect: Mood normal.         Behavior: Behavior normal.        Procedures     CT ABDOMEN PELVIS W IV CONTRAST Additional Contrast? None   Final Result   There is wall thickening of the distal esophagus which is distended with   fluid. Patient has history of achalasia and prior botox treatment. There is gastric diverticulum extending posteriorly from the body to fundus   of the stomach with retention of barium or other dense material.      Mild wall thickening of the antrum of the stomach likely secondary to poor   distention as there is no surrounding inflammatory change. There is a stone in right renal pelvis measuring 1.7 cm. There is mild   distention of the right renal pelvis. There is inflammatory stranding   surrounding the renal pelvis and proximal right ureter suggesting secondary   ureteritis. However there is also wall thickening and inflammatory stranding involving   the urinary bladder suggesting cystitis. A few tiny stones are noted within   the urinary bladder. There is dense material perhaps cream application at the perineum and vagina. Clinical correlation recommended. Small left pleural effusion with overlying atelectasis. Clinical correlation   and follow-up to resolution recommended. EKG:  This EKG is signed by emergency department physician. Rate: 57  Rhythm: Sinus  Interpretation: sinus bradycardia  Comparison: changes compared to previous EKG     MDM:  65y.o. year old female presenting to the ER with complaints of coffee-ground emesis, melena. Spoke to facility who reported the patient had 1 episode of darker stools with questionable emesis at facility. Patient was seen by wound care nurse and sent down. Alert, and at baseline denies any pain on initial exam reports that she is hungry. No elevation white blood cell count with hemoglobin stable from previous. Lactic negative lipase negative serum potassium within normal limits kidney function within normal limits. UA positive for infection. Daughter at bedside reports patient is at baseline discussed results with daughter thus far. Repeat hemoglobin drawn. Patient able to tolerate p.o. challenge. Given Rocephin, Protonix. Hemoccult negative. Discussed with family at bedside and through shared decision-making plan to discharge patient at this time with follow-up with PCP and Dr. Danna Young. .  Patient stable at time of discharge. Awaiting ambulance. ED Course as of 03/10/23 0247   Thu Mar 09, 2023   1415 I reviewed the patient's chart. Patient underwent EGD by Dr. Shelby Graham on 2/27/2022. She was noted to have a hiatal hernia. She underwent dilation at that time. Patient underwent colonoscopy on 8/25/2022 by Maria Guadalupe Lopez. She was noted to have diverticulosis [JA]   1443 EKG: This EKG is signed and interpreted by me, Dr. Armando Alston MD    Rate: 57  Rhythm: Sinus  Interpretation: Sinus bradycardia, normal CA interval, normal QRS, normal axis, normal QT interval, no acute ST or T wave changes  Comparison: stable as compared to patient's most recent EKG   [JA]   1444 Patient is a 76-year-old female presenting from the nursing home due to reported episode of melena and coffee-ground emesis. She is unable to provide any meaningful history. He was hemodynamically stable and afebrile in the ED. No abdominal tenderness. At her neurologic baseline. [MR]   6058 Spoke to Perry Cifuentes, discussed patient, reported patient had 1 episode of questionably brown emesis while being changed for a darker stool. No other complaints or other episodes reported from facility. [RADHA]      ED Course User Index  [JA] Ricadro Pineda MD  [RADHA] Nicole Lee DO        ED Course as of 03/10/23 0247   Thu Mar 09, 2023   1415 I reviewed the patient's chart. Patient underwent EGD by Dr. Shelby Graham on 2/27/2022.   She was noted to have a hiatal hernia. She underwent dilation at that time. Patient underwent colonoscopy on 8/25/2022 by Nishant Mcfarlane. She was noted to have diverticulosis [JA]   1443 EKG: This EKG is signed and interpreted by me, Dr. Frances Bob MD    Rate: 57  Rhythm: Sinus  Interpretation: Sinus bradycardia, normal IN interval, normal QRS, normal axis, normal QT interval, no acute ST or T wave changes  Comparison: stable as compared to patient's most recent EKG   [JA]   1444 Patient is a 78-year-old female presenting from the nursing home due to reported episode of melena and coffee-ground emesis. She is unable to provide any meaningful history. He was hemodynamically stable and afebrile in the ED. No abdominal tenderness. At her neurologic baseline. [QY]   1433 Spoke to Perry Cifuentes, discussed patient, reported patient had 1 episode of questionably brown emesis while being changed for a darker stool. No other complaints or other episodes reported from facility.  [RADHA]      ED Course User Index  [JA] Kalyan Hyman MD  [RADHA] Walker Garrett, DO       --------------------------------------------- PAST HISTORY ---------------------------------------------  Past Medical History:  has a past medical history of Aortic stenosis, 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 ([address] CAD (coronary artery disease), Cannot walk, Cerebral artery occlusion with cerebral infarction [facility]), Cerebrovascular disease, Contracture of left hand, Decreased dorsalis pedis pulse, Diabetes mellitus ([address] Diverticulitis, dyslipidemia, Dysphagia, GERD (gastroesophageal reflux disease), Hemiparesis of left nondominant side as late effect of cerebrovascular disease ([address] Hypertension, Hypothyroidism, MI (myocardial infarction) ([address] Obesity, Osteoporosis, Thyroid disease, TIA (transient ischemic attack), and Type II or unspecified type diabetes mellitus without mention of complication, not stated as uncontrolled. Past Surgical History:  has a past surgical history that includes Hysterectomy; hernia repair; Cholecystectomy; Dilatation, esophagus; Cardiac catheterization (06/06/2012); vascular surgery; Tonsillectomy; Appendectomy; Diagnostic Cardiac Cath Lab Procedure; Cardiac surgery (06/15/2012); Coronary artery bypass graft (06/15/2012); Cardiac valve replacement (06/01/2012); Upper gastrointestinal endoscopy (N/A, 06/27/2022); and Colonoscopy (N/A, 8/25/2022). Social History:  reports that she quit smoking about 52 years ago. Her smoking use included cigarettes. She smoked an average of .5 packs per day. She has never used smokeless tobacco. She reports that she does not drink alcohol and does not use drugs. Family History: family history includes Heart Disease in her brother and father; High Blood Pressure in her brother and father. The patients home medications have been reviewed.     Allergies: Phenergan [promethazine hcl]    -------------------------------------------------- RESULTS -------------------------------------------------  Labs:  Results for orders placed or performed during the hospital encounter of 03/09/23   CBC with Auto Differential   Result Value Ref Range    WBC 7.8 4.5 - 11.5 E9/L    RBC 3.16 (L) 3.50 - 5.50 E12/L    Hemoglobin 9.8 (L) 11.5 - 15.5 g/dL    Hematocrit 31.3 (L) 34.0 - 48.0 %    MCV 99.1 80.0 - 99.9 fL    MCH 31.0 26.0 - 35.0 pg    MCHC 31.3 (L) 32.0 - 34.5 %    RDW 13.1 11.5 - 15.0 fL    Platelets 461 184 - 035 E9/L    MPV 11.6 7.0 - 12.0 fL    Neutrophils % 57.6 43.0 - 80.0 %    Immature Granulocytes % 0.5 0.0 - 5.0 %    Lymphocytes % 31.6 20.0 - 42.0 %    Monocytes % 6.6 2.0 - 12.0 %    Eosinophils % 3.1 0.0 - 6.0 %    Basophils % 0.6 0.0 - 2.0 %    Neutrophils Absolute 4.46 1.80 - 7.30 E9/L    Immature Granulocytes # 0.04 E9/L    Lymphocytes Absolute 2.45 1.50 - 4.00 E9/L    Monocytes Absolute 0.51 0.10 - 0.95 E9/L    Eosinophils Absolute 0.24 0.05 - 0.50 E9/L    Basophils Absolute 0.05 0.00 - 0.20 E9/L   Lipase   Result Value Ref Range    Lipase 20 13 - 60 U/L   BMP   Result Value Ref Range    Sodium 135 132 - 146 mmol/L    Potassium 4.3 3.5 - 5.0 mmol/L    Chloride 102 98 - 107 mmol/L    CO2 26 22 - 29 mmol/L    Anion Gap 7 7 - 16 mmol/L    Glucose 126 (H) 74 - 99 mg/dL    BUN 20 6 - 23 mg/dL    Creatinine 0.6 0.5 - 1.0 mg/dL    Est, Glom Filt Rate >60 >=60 mL/min/1.73    Calcium 8.9 8.6 - 10.2 mg/dL   Hepatic Function Panel   Result Value Ref Range    Total Protein 6.0 (L) 6.4 - 8.3 g/dL    Albumin 3.0 (L) 3.5 - 5.2 g/dL    Alkaline Phosphatase 94 35 - 104 U/L    ALT 20 0 - 32 U/L    AST 28 0 - 31 U/L    Total Bilirubin 0.2 0.0 - 1.2 mg/dL    Bilirubin, Direct <0.2 0.0 - 0.3 mg/dL    Bilirubin, Indirect see below 0.0 - 1.0 mg/dL   Lactic Acid   Result Value Ref Range    Lactic Acid 1.1 0.5 - 2.2 mmol/L   Protime-INR   Result Value Ref Range    Protime 11.5 9.3 - 12.4 sec    INR 1.0    APTT   Result Value Ref Range    aPTT 28.7 24.5 - 35.1 sec   Hemoglobin and Hematocrit   Result Value Ref Range    Hemoglobin 9.1 (L) 11.5 - 15.5 g/dL    Hematocrit 28.6 (L) 34.0 - 48.0 %   Urinalysis with Microscopic   Result Value Ref Range    Color, UA Yellow Straw/Yellow    Clarity, UA Clear Clear    Glucose, Ur Negative Negative mg/dL    Bilirubin Urine Negative Negative    Ketones, Urine Negative Negative mg/dL    Specific Gravity, UA <=1.005 1.005 - 1.030    Blood, Urine SMALL (A) Negative    pH, UA 6.0 5.0 - 9.0    Protein, UA Negative Negative mg/dL    Urobilinogen, Urine 0.2 <2.0 E.U./dL    Nitrite, Urine Negative Negative    Leukocyte Esterase, Urine LARGE (A) Negative    WBC, UA 10-20 (A) 0 - 5 /HPF    RBC, UA 2-5 0 - 2 /HPF    Bacteria, UA RARE (A) None Seen /HPF   EKG 12 Lead   Result Value Ref Range    Ventricular Rate 57 BPM    Atrial Rate 57 BPM    P-R Interval 168 ms    QRS Duration 86 ms    Q-T Interval 486 ms    QTc Calculation (Bazett) 473 ms    P Axis 18 degrees R Axis 12 degrees    T Axis 64 degrees   TYPE AND SCREEN   Result Value Ref Range    ABO/Rh A NEG     Antibody Screen NEG        Radiology:  CT ABDOMEN PELVIS W IV CONTRAST Additional Contrast? None   Final Result   There is wall thickening of the distal esophagus which is distended with   fluid. Patient has history of achalasia and prior botox treatment. There is gastric diverticulum extending posteriorly from the body to fundus   of the stomach with retention of barium or other dense material.      Mild wall thickening of the antrum of the stomach likely secondary to poor   distention as there is no surrounding inflammatory change. There is a stone in right renal pelvis measuring 1.7 cm. There is mild   distention of the right renal pelvis. There is inflammatory stranding   surrounding the renal pelvis and proximal right ureter suggesting secondary   ureteritis. However there is also wall thickening and inflammatory stranding involving   the urinary bladder suggesting cystitis. A few tiny stones are noted within   the urinary bladder. There is dense material perhaps cream application at the perineum and vagina. Clinical correlation recommended. Small left pleural effusion with overlying atelectasis. Clinical correlation   and follow-up to resolution recommended. ------------------------- NURSING NOTES AND VITALS REVIEWED ---------------------------  Date / Time Roomed:  3/9/2023  1:46 PM  ED Bed Assignment:  08/08    The nursing notes within the ED encounter and vital signs as below have been reviewed.    BP (!) 135/44   Pulse 59   Temp 98.8 °F (37.1 °C) (Oral)   Resp 11   Ht 5' 1\" (1.549 m)   Wt 98 lb (44.5 kg)   SpO2 97%   BMI 18.52 kg/m²   Oxygen Saturation Interpretation: Normal      ------------------------------------------ PROGRESS NOTES ------------------------------------------  I have spoken with the patient and discussed todays results, in addition to providing specific details for the plan of care and counseling regarding the diagnosis and prognosis. Their questions are answered at this time and they are agreeable with the plan. I discussed at length with them reasons for immediate return here for re evaluation. They will followup with primary care by calling their office tomorrow. --------------------------------- ADDITIONAL PROVIDER NOTES ---------------------------------  At this time the patient is without objective evidence of an acute process requiring hospitalization or inpatient management. They have remained hemodynamically stable throughout their entire ED visit and are stable for discharge with outpatient follow-up. The plan has been discussed in detail and they are aware of the specific conditions for emergent return, as well as the importance of follow-up. Discharge Medication List as of 3/10/2023  1:52 AM          Diagnosis:  1. Acute cystitis with hematuria    2. Nausea and vomiting, unspecified vomiting type        Disposition:  Patient's disposition: Discharge to home  Patient's condition is stable. Attending was present and available throughout encounter including all critical portions;  See Attending Note/Attestation for Final [address], DO  Resident  03/10/23 9087

## 2023-03-09 NOTE — ED NOTES
Called daughter souleymane to make sure she knew her mom was sent to the ER. She stated that she does know and will be over later.      Shanna Peter RN  03/09/23 4736

## 2023-03-09 NOTE — ED NOTES
Food challenge started at 1750 with mashed potatoes and chicken gravy and pepsi.      Mara Garza RN  03/09/23 1757

## 2023-03-10 VITALS
RESPIRATION RATE: 11 BRPM | WEIGHT: 98 LBS | HEIGHT: 61 IN | TEMPERATURE: 98.8 F | OXYGEN SATURATION: 97 % | BODY MASS INDEX: 18.5 KG/M2 | DIASTOLIC BLOOD PRESSURE: 44 MMHG | SYSTOLIC BLOOD PRESSURE: 135 MMHG | HEART RATE: 59 BPM

## 2023-03-10 LAB
EKG ATRIAL RATE: 57 BPM
EKG P AXIS: 18 DEGREES
EKG P-R INTERVAL: 168 MS
EKG Q-T INTERVAL: 486 MS
EKG QRS DURATION: 86 MS
EKG QTC CALCULATION (BAZETT): 473 MS
EKG R AXIS: 12 DEGREES
EKG T AXIS: 64 DEGREES
EKG VENTRICULAR RATE: 57 BPM

## 2023-03-10 PROCEDURE — 93010 ELECTROCARDIOGRAM REPORT: CPT | Performed by: INTERNAL MEDICINE

## 2023-03-10 NOTE — DISCHARGE INSTRUCTIONS
Return to ED if any worsening symptoms or alarming changes occur. CT ABDOMEN PELVIS W IV CONTRAST Additional Contrast? None   Final Result   There is wall thickening of the distal esophagus which is distended with   fluid. Patient has history of achalasia and prior botox treatment. There is gastric diverticulum extending posteriorly from the body to fundus   of the stomach with retention of barium or other dense material.      Mild wall thickening of the antrum of the stomach likely secondary to poor   distention as there is no surrounding inflammatory change. There is a stone in right renal pelvis measuring 1.7 cm. There is mild   distention of the right renal pelvis. There is inflammatory stranding   surrounding the renal pelvis and proximal right ureter suggesting secondary   ureteritis. However there is also wall thickening and inflammatory stranding involving   the urinary bladder suggesting cystitis. A few tiny stones are noted within   the urinary bladder. There is dense material perhaps cream application at the perineum and vagina. Clinical correlation recommended. Small left pleural effusion with overlying atelectasis. Clinical correlation   and follow-up to resolution recommended.

## 2023-03-10 NOTE — ED NOTES
EMS here for transport. Report called to facility. Transferred without incident.      Deysi Bar RN  03/10/23 4221

## 2023-04-06 ENCOUNTER — TELEPHONE (OUTPATIENT)
Dept: ENT CLINIC | Age: 78
End: 2023-04-06

## 2023-04-06 NOTE — TELEPHONE ENCOUNTER
Darius Pride from [facility] called to schedule patient for referral for nasal obstruction- Please advise if approved to schedule with Dr Lottie Reynolds

## 2023-04-28 RX ORDER — POLYETHYLENE GLYCOL 3350 17 G/17G
17 POWDER, FOR SOLUTION ORAL DAILY
COMMUNITY

## 2023-04-28 RX ORDER — BISACODYL 10 MG
10 SUPPOSITORY, RECTAL RECTAL DAILY PRN
COMMUNITY

## 2023-04-28 RX ORDER — CALCIUM CARBONATE 200(500)MG
2 TABLET,CHEWABLE ORAL EVERY 4 HOURS PRN
COMMUNITY

## 2023-04-28 RX ORDER — FERROUS SULFATE 325(65) MG
325 TABLET ORAL
COMMUNITY

## 2023-04-28 RX ORDER — EVOLOCUMAB 140 MG/ML
140 INJECTION, SOLUTION SUBCUTANEOUS
COMMUNITY

## 2023-05-02 ENCOUNTER — ANESTHESIA (OUTPATIENT)
Dept: ENDOSCOPY | Age: 78
End: 2023-05-02
Payer: COMMERCIAL

## 2023-05-02 ENCOUNTER — ANESTHESIA EVENT (OUTPATIENT)
Dept: ENDOSCOPY | Age: 78
End: 2023-05-02
Payer: COMMERCIAL

## 2023-05-02 ENCOUNTER — HOSPITAL ENCOUNTER (OUTPATIENT)
Age: 78
Setting detail: OUTPATIENT SURGERY
Discharge: OTHER INSTITUTION | End: 2023-05-02
Attending: INTERNAL MEDICINE | Admitting: INTERNAL MEDICINE
Payer: COMMERCIAL

## 2023-05-02 VITALS
TEMPERATURE: 97.4 F | RESPIRATION RATE: 16 BRPM | OXYGEN SATURATION: 96 % | HEART RATE: 68 BPM | DIASTOLIC BLOOD PRESSURE: 67 MMHG | BODY MASS INDEX: 17.85 KG/M2 | WEIGHT: 97 LBS | HEIGHT: 62 IN | SYSTOLIC BLOOD PRESSURE: 142 MMHG

## 2023-05-02 LAB — METER GLUCOSE: 104 MG/DL (ref 74–99)

## 2023-05-02 PROCEDURE — 2580000003 HC RX 258: Performed by: NURSE ANESTHETIST, CERTIFIED REGISTERED

## 2023-05-02 PROCEDURE — 3609013800 HC EGD SUBMUCOSAL/BOTOX INJECTION: Performed by: INTERNAL MEDICINE

## 2023-05-02 PROCEDURE — 6360000002 HC RX W HCPCS: Performed by: INTERNAL MEDICINE

## 2023-05-02 PROCEDURE — 6360000002 HC RX W HCPCS: Performed by: NURSE ANESTHETIST, CERTIFIED REGISTERED

## 2023-05-02 PROCEDURE — 3700000000 HC ANESTHESIA ATTENDED CARE: Performed by: INTERNAL MEDICINE

## 2023-05-02 PROCEDURE — 7100000011 HC PHASE II RECOVERY - ADDTL 15 MIN: Performed by: INTERNAL MEDICINE

## 2023-05-02 PROCEDURE — 7100000010 HC PHASE II RECOVERY - FIRST 15 MIN: Performed by: INTERNAL MEDICINE

## 2023-05-02 PROCEDURE — 3700000001 HC ADD 15 MINUTES (ANESTHESIA): Performed by: INTERNAL MEDICINE

## 2023-05-02 PROCEDURE — 82962 GLUCOSE BLOOD TEST: CPT

## 2023-05-02 PROCEDURE — 2709999900 HC NON-CHARGEABLE SUPPLY: Performed by: INTERNAL MEDICINE

## 2023-05-02 RX ORDER — SODIUM CHLORIDE 9 MG/ML
INJECTION, SOLUTION INTRAVENOUS CONTINUOUS PRN
Status: DISCONTINUED | OUTPATIENT
Start: 2023-05-02 | End: 2023-05-02 | Stop reason: SDUPTHER

## 2023-05-02 RX ORDER — PROPOFOL 10 MG/ML
INJECTION, EMULSION INTRAVENOUS PRN
Status: DISCONTINUED | OUTPATIENT
Start: 2023-05-02 | End: 2023-05-02 | Stop reason: SDUPTHER

## 2023-05-02 RX ORDER — SODIUM CHLORIDE 9 MG/ML
INJECTION, SOLUTION INTRAVENOUS CONTINUOUS
Status: DISCONTINUED | OUTPATIENT
Start: 2023-05-02 | End: 2023-05-02 | Stop reason: HOSPADM

## 2023-05-02 RX ADMIN — PROPOFOL 30 MG: 10 INJECTION, EMULSION INTRAVENOUS at 15:09

## 2023-05-02 RX ADMIN — SODIUM CHLORIDE: 9 INJECTION, SOLUTION INTRAVENOUS at 14:58

## 2023-05-02 RX ADMIN — PROPOFOL 50 MG: 10 INJECTION, EMULSION INTRAVENOUS at 15:02

## 2023-05-02 ASSESSMENT — PAIN - FUNCTIONAL ASSESSMENT: PAIN_FUNCTIONAL_ASSESSMENT: 0-10

## 2023-05-02 ASSESSMENT — PAIN SCALES - GENERAL
PAINLEVEL_OUTOF10: 0

## 2023-05-02 NOTE — DISCHARGE INSTRUCTIONS
for changes in your health, and be sure to contact your doctor if:    Your throat still hurts after a day or two. You do not get better as expected. Where can you learn more? Go to http://www.woods.com/ and enter J454 to learn more about \"Upper GI Endoscopy: What to Expect at Home. \"  Current as of: June 6, 2022               Content Version: 13.6  © 2006-2023 [employer], [email or web address]. Care instructions adapted under license by [facility] ([facility]). If you have questions about a medical condition or this instruction, always ask your healthcare professional. [address] any warranty or liability for your use of this information.

## 2023-05-02 NOTE — PROCEDURES
Procedure:  Esophagogastroduodenoscopy with 100 units Botox injection into lower esophageal sphincter    Indication: Achalasia requiring intermittent Botox    Sedation  MAC    Endoscope was advanced easily through mouth to second portion of duodenum      Oropharynx views are limited but grossly normal.    Esophagus:   Mucosa is normal.  GEJ at 35 cm. 1 cm above the GE junction a total of 100 units of Botox was injected in a four-quadrant fashion with 25 units/mL into each quadrant. Stomach:   5 cm hiatal hernia                          Antrum is normal    Gastric body is normal.    Retroflexed views show normal fundus and cardia. Duodenum: Bulb is normal.    Second portion of duodenum is normal.    EBL: Less than 1 cc    IMPRESSION AND PLAN:     1. Somewhat tight GE junction with proximal dilation of a tortuous esophagus  2. Hiatal hernia    Recommendations: Await response to Botox. Follow-up injections as needed. Follow up as outpatient in office, call [phone] to schedule for appointment.       Kathy Labyo MD  5/2/2023  3:12 PM      [id number]

## 2023-05-02 NOTE — H&P
Immediately prior to the procedure the patient's History and Physical was reviewed- there are no changes with the current vitals . Blood pressure (!) 142/61, pulse 64, temperature 97.9 °F (36.6 °C), temperature source Temporal, resp. rate 16, height 5' 2\" (1.575 m), weight 97 lb (44 kg), SpO2 96 %.
Provider, MD   CRESTOR 10 MG tablet Take 1 tablet by mouth daily. 8/13/13   Demario Gregg MD   levothyroxine (SYNTHROID) 50 MCG tablet Take 1 tablet by mouth daily    Historical Provider, MD   carvedilol (COREG) 3.125 MG tablet Take 1 tablet by mouth 2 times daily (with meals)    Historical Provider, MD   sertraline (ZOLOFT) 100 MG tablet Take 1 tablet by mouth at bedtime    Historical Provider, MD         ALLERGIES:  Allergies   Allergen Reactions    Phenergan [Promethazine Hcl]           SOCIAL Hx:  Social History       Tobacco History       Smoking Status  Former Quit Date  6/6/1970 Smoking Frequency  0.50 packs/day Smoking Tobacco Type  Cigarettes quit in 6/6/1970      Smokeless Tobacco Use  Never              Alcohol History       Alcohol Use Status  No              Drug Use       Drug Use Status  No              Sexual Activity       Sexually Active  Not Asked                     PE:  BP (!) 140/77   Pulse 50   Temp 98.6 °F (37 °C) (Oral)   Resp 14   Ht 5' 2\" (1.575 m)   Wt 95 lb (43.1 kg)   SpO2 95%   BMI 17.38 kg/m²      General: A&Ox 3, friendly, NAD  HEENT: Atraumatic, symmetric, no anterior/posterior lymphadenopathy, moist mucous membranes, PERRL, EOM intact, fair dentition. Pulm: CTAB, Neg w/r/r, normal chest expansion, no crackles noted  Cardio: RRR, neg m/r/g, nl S1 and S2, no extra heart sounds  Abd.: soft, NT, ND, BS+, no G/R, no HSM  Ext: +2/4 pulse Dp and radial b/l, LE and UE ROM intact,   Skin: No lesions, excoriations, petechiae, or ecchymoses noted    Neuro: normal sensation throughout, DTRs patellar, tricept, and bicept 2/4 b/l and equal, normal muscle strength throughout. DATA:  Lab Results   Component Value Date/Time    INR 1.0 03/09/2023 02:15 PM               ASSESSMENT/PLAN:  1.   Achalasia  -EGD with Botox injection    Titi Moss MD  8:31 AM  [unfilled]

## 2023-05-02 NOTE — ANESTHESIA PRE PROCEDURE
11:57 AM       CMP:   Lab Results   Component Value Date/Time     04/26/2023 11:57 AM    K 4.8 04/26/2023 11:57 AM    K 3.6 06/24/2022 09:46 AM     04/26/2023 11:57 AM    CO2 26 03/09/2023 02:14 PM    BUN 20 03/09/2023 02:14 PM    CREATININE 0.53 04/26/2023 11:57 AM    GFRAA >60 06/29/2022 02:33 AM    LABGLOM >60 03/09/2023 02:14 PM    GLUCOSE 128 04/26/2023 11:57 AM    PROT 6.6 04/26/2023 11:57 AM    CALCIUM 9.9 04/26/2023 11:57 AM    BILITOT 0.5 04/26/2023 11:57 AM    ALKPHOS 90 04/26/2023 11:57 AM    AST 22 04/26/2023 11:57 AM    ALT 16 04/26/2023 11:57 AM       POC Tests: No results for input(s): POCGLU, POCNA, POCK, POCCL, POCBUN, POCHEMO, POCHCT in the last 72 hours.     Coags:   Lab Results   Component Value Date/Time    PROTIME 11.5 03/09/2023 02:15 PM    PROTIME 13.1 06/04/2012 10:00 AM    INR 1.0 03/09/2023 02:15 PM    APTT 28.7 03/09/2023 02:15 PM       HCG (If Applicable): No results found for: PREGTESTUR, PREGSERUM, HCG, HCGQUANT     ABGs: No results found for: PHART, PO2ART, NCU3JWF, NCY5ELX, BEART, C7PLBDBK     Type & Screen (If Applicable):  No results found for: LABABO, LABRH    Drug/Infectious Status (If Applicable):  No results found for: HIV, HEPCAB    COVID-19 Screening (If Applicable): No results found for: COVID19        Anesthesia Evaluation  Patient summary reviewed no history of anesthetic complications:   Airway: Mallampati: II  TM distance: >3 FB   Neck ROM: full  Mouth opening: > = 3 FB   Dental:          Pulmonary:normal exam    (+) asthma:                           ROS comment: Former Smoker   Cardiovascular:    (+) hypertension:, valvular problems/murmurs (AVR, MVR):, past MI: > 6 months, CAD: obstructive, CABG/stent:, hyperlipidemia      ECG reviewed                     ROS comment: 09-MAR-2023 14:41:56 [address] Sinus bradycardia Otherwise normal ECG No previous ECGs available Confirmed by Rylee Washington ([id number]) on 3/10/2023 12:24:01 PM

## 2023-05-03 NOTE — ANESTHESIA POSTPROCEDURE EVALUATION
Department of Anesthesiology  Postprocedure Note    Patient: Abdoulaye Manning  MRN: [id number]  YOB: 1945  Date of evaluation: 5/2/2023      Procedure Summary     Date: 05/02/23 Room / Location: [facility] / [facility]    Anesthesia Start: 2703 Anesthesia Stop: 0613    Procedure: EGD BOTOX INJECTION   (200 UNITS)      ++FACILITY++ Diagnosis:       Dysphagia, unspecified type      (Dysphagia, unspecified type [R13.10])    Surgeons: Freda Ríos MD Responsible Provider: Radha Stein MD    Anesthesia Type: MAC ASA Status: 4          Anesthesia Type: MAC    Shireen Phase I: Shireen Score: 9    Shireen Phase II: Shireen Score: 10      Anesthesia Post Evaluation    Patient location during evaluation: PACU  Patient participation: complete - patient participated  Level of consciousness: awake and alert  Pain score: 2  Airway patency: patent  Nausea & Vomiting: no nausea and no vomiting  Complications: no  Cardiovascular status: blood pressure returned to baseline  Respiratory status: acceptable  Hydration status: euvolemic

## 2023-05-15 ENCOUNTER — PROCEDURE VISIT (OUTPATIENT)
Dept: AUDIOLOGY | Age: 78
End: 2023-05-15
Payer: COMMERCIAL

## 2023-05-15 DIAGNOSIS — H90.3 SENSORINEURAL HEARING LOSS (SNHL) OF BOTH EARS: Primary | ICD-10-CM

## 2023-05-15 PROCEDURE — 92567 TYMPANOMETRY: CPT | Performed by: AUDIOLOGIST

## 2023-05-15 PROCEDURE — 92557 COMPREHENSIVE HEARING TEST: CPT | Performed by: AUDIOLOGIST

## 2023-05-16 NOTE — PROGRESS NOTES
This patient was referred for audiometric and tympanometric testing by Dr. Luci Peter due to hearing loss. Audiometry using pure tone air and bone conduction testing revealed a moderate sloping to moderately severe sensorineural hearing loss, in the right ear and a moderately severe sloping to profound sensorineural hearing loss, in the left ear. Reliability was good. Speech reception thresholds were in good agreement with the pure tone averages, bilaterally. Speech discrimination scores were excellent, in the right ear. Asymmetries noted throughout frequency range, in the left ear. Tympanometry revealed normal middle ear peak pressure and compliance, in the right ear and reduced compliance, in the left ear. The results were reviewed with the patient. Recommendations for follow up will be made pending physician consult.       Caleb Truong [facility]  [address] [id number]   Electronically signed by Caleb Truong on 5/16/2023 at 9:31 AM

## 2023-11-20 ENCOUNTER — OFFICE VISIT (OUTPATIENT)
Dept: ENT CLINIC | Age: 78
End: 2023-11-20

## 2023-11-20 ENCOUNTER — TELEPHONE (OUTPATIENT)
Dept: AUDIOLOGY | Age: 78
End: 2023-11-20

## 2023-11-20 DIAGNOSIS — H61.23 HEARING LOSS DUE TO CERUMEN IMPACTION, BILATERAL: ICD-10-CM

## 2023-11-20 DIAGNOSIS — H90.3 SENSORY HEARING LOSS, BILATERAL: Primary | ICD-10-CM

## 2023-11-20 ASSESSMENT — ENCOUNTER SYMPTOMS
VOMITING: 0
SHORTNESS OF BREATH: 0
TROUBLE SWALLOWING: 0
RHINORRHEA: 0
SORE THROAT: 0
COUGH: 0
SINUS PAIN: 0
VOICE CHANGE: 0

## 2023-11-20 NOTE — PROGRESS NOTES
ascorbic acid (VITAMIN C) 500 MG tablet, Take 1 tablet by mouth daily, Disp: , Rfl:     docusate sodium (COLACE) 100 MG capsule, Take 1 capsule by mouth 2 times daily, Disp: , Rfl:     cyclobenzaprine (FLEXERIL) 5 MG tablet, Take 1 tablet by mouth 3 times daily as needed for Muscle spasms, Disp: , Rfl:     losartan (COZAAR) 25 MG tablet, Take 1 tablet by mouth daily, Disp: , Rfl:     melatonin 3 MG TABS tablet, Take 1 tablet by mouth daily, Disp: , Rfl:     amLODIPine (NORVASC) 2.5 MG tablet, Take 1 tablet by mouth daily, Disp: , Rfl:     pantoprazole (PROTONIX) 40 MG tablet, Take 1 tablet by mouth in the morning and at bedtime, Disp: , Rfl:     zinc sulfate (ZINCATE) 220 (50 Zn) MG capsule, Take 1 capsule by mouth daily, Disp: , Rfl:     ondansetron (ZOFRAN) 4 MG tablet, Take 1 tablet by mouth every 8 hours as needed for Nausea or Vomiting, Disp: , Rfl:     Multiple Vitamins-Minerals (THERAPEUTIC MULTIVITAMIN-MINERALS) tablet, Take 1 tablet by mouth daily, Disp: , Rfl:     CRESTOR 10 MG tablet, Take 1 tablet by mouth daily. , Disp: 30 tablet, Rfl: 6    levothyroxine (SYNTHROID) 50 MCG tablet, Take 1 tablet by mouth daily, Disp: , Rfl:     carvedilol (COREG) 3.125 MG tablet, Take 1 tablet by mouth 2 times daily (with meals), Disp: , Rfl:     sertraline (ZOLOFT) 100 MG tablet, Take 1 tablet by mouth at bedtime, Disp: , Rfl:   Phenergan [promethazine hcl]  Social History     Tobacco Use    Smoking status: Former     Packs/day: .5     Types: Cigarettes     Quit date: 1970     Years since quittin.4    Smokeless tobacco: Never   Vaping Use    Vaping Use: Never used   Substance Use Topics    Alcohol use: No    Drug use: No     Family History   Problem Relation Age of Onset    High Blood Pressure Father     Heart Disease Father     Heart Disease Brother     High Blood Pressure Brother        Review of Systems   Constitutional:  Negative for chills and fever. HENT:  Positive for hearing loss.  Negative for

## 2023-11-20 NOTE — TELEPHONE ENCOUNTER
Patient cleared for hearing aids, per Dr Taar Walker. [facility] insurance. Will need PA sent to [facility] and patient wants to remain at [facility] Audiology office. Instructed patient to call department, if she receives HA prior auth determination before she hears from this site. Contact info, for department, given to daughter/JANISA Rosalene Collet).     O[place], CCC/A  Audiologist  [id number]  NPI#:  [id number]

## 2024-02-08 ENCOUNTER — TELEPHONE (OUTPATIENT)
Dept: AUDIOLOGY | Age: 79
End: 2024-02-08

## 2024-02-08 NOTE — TELEPHONE ENCOUNTER
Spoke to patient's daughter regarding hearing aid PA to [facility].  Explained the time frame for [facility] to daughter.  Will resubmit the PA packet, per daughter request.    Titus Teran CCC/YARELIS  Audiologist  [id number]  NPI#:  [id number]

## 2024-03-13 ENCOUNTER — TELEPHONE (OUTPATIENT)
Dept: AUDIOLOGY | Age: 79
End: 2024-03-13

## 2024-03-13 NOTE — TELEPHONE ENCOUNTER
Called [facility] to check progress of hearing aid prior auth. Representative reported that prior auth was voided and not required, and patient is in network with [facility]. Asked that this be faxed to [phone]. Representative said she will fax decision here. Decision ID #[id number].   Will order hearing aids.

## 2024-03-19 ENCOUNTER — TELEPHONE (OUTPATIENT)
Dept: AUDIOLOGY | Age: 79
End: 2024-03-19

## 2024-03-20 ENCOUNTER — TELEPHONE (OUTPATIENT)
Dept: AUDIOLOGY | Age: 79
End: 2024-03-20

## 2024-03-20 NOTE — TELEPHONE ENCOUNTER
Received voicemail from patient's nurse asking about hearing aids. She reported that patient is bedbound, on hospice, and not able to come to any appointments. Called back, reported that patient needs to be able to come in person in order to dispense hearing aids, per Medicaid guidelines. Advised her that the family can look at getting OTC amplifiers or [employer].  Called patient's daughter, reviewed these options. Reported that I will need to send hearing aids back, if patient is unable to come in to be fit, per Medicaid guidelines.

## 2024-03-27 ENCOUNTER — TELEPHONE (OUTPATIENT)
Dept: AUDIOLOGY | Age: 79
End: 2024-03-27

## 2024-03-27 NOTE — TELEPHONE ENCOUNTER
Have not gotten any response from patient's family re: ability to come in for hearing aid fitting. Will RFC hearing aids.

## (undated) DEVICE — SPONGE GZ W4XL4IN RAYON POLY FILL CVR W/ NONWOVEN FAB

## (undated) DEVICE — GRADUATE TRIANG MEASURE 1000ML BLK PRNT

## (undated) DEVICE — SPONGE GZ W4XL4IN RAYON POLY FILL CVR W/ NONWOVEN FAB STERILE

## (undated) DEVICE — BLOCK BITE 60FR RUBBER ADLT DENTAL

## (undated) DEVICE — NEEDLE SCLERO L200CM OD0.51MM ID0.24MM SHTH DIA1.8MM LOK

## (undated) DEVICE — SYRINGE INFL 60ML DISP ALLIANCE II

## (undated) DEVICE — ESOPHAGEAL BALLOON DILATATION CATHETER: Brand: CRE FIXED WIRE